# Patient Record
Sex: FEMALE | Race: WHITE | NOT HISPANIC OR LATINO | URBAN - METROPOLITAN AREA
[De-identification: names, ages, dates, MRNs, and addresses within clinical notes are randomized per-mention and may not be internally consistent; named-entity substitution may affect disease eponyms.]

---

## 2017-11-04 ENCOUNTER — OUTPATIENT (OUTPATIENT)
Dept: OUTPATIENT SERVICES | Facility: HOSPITAL | Age: 27
LOS: 1 days | End: 2017-11-04
Payer: COMMERCIAL

## 2017-11-04 DIAGNOSIS — Z3A.00 WEEKS OF GESTATION OF PREGNANCY NOT SPECIFIED: ICD-10-CM

## 2017-11-04 DIAGNOSIS — O26.899 OTHER SPECIFIED PREGNANCY RELATED CONDITIONS, UNSPECIFIED TRIMESTER: ICD-10-CM

## 2017-11-04 PROBLEM — Z00.00 ENCOUNTER FOR PREVENTIVE HEALTH EXAMINATION: Status: ACTIVE | Noted: 2017-11-04

## 2017-11-04 LAB
APPEARANCE UR: CLEAR — SIGNIFICANT CHANGE UP
BILIRUB UR-MCNC: NEGATIVE — SIGNIFICANT CHANGE UP
BLOOD UR QL VISUAL: NEGATIVE — SIGNIFICANT CHANGE UP
COLOR SPEC: SIGNIFICANT CHANGE UP
GLUCOSE UR-MCNC: NEGATIVE — SIGNIFICANT CHANGE UP
KETONES UR-MCNC: SIGNIFICANT CHANGE UP
LEUKOCYTE ESTERASE UR-ACNC: NEGATIVE — SIGNIFICANT CHANGE UP
NITRITE UR-MCNC: NEGATIVE — SIGNIFICANT CHANGE UP
PH UR: 6.5 — SIGNIFICANT CHANGE UP (ref 4.6–8)
PROT UR-MCNC: NEGATIVE — SIGNIFICANT CHANGE UP
RBC CASTS # UR COMP ASSIST: SIGNIFICANT CHANGE UP (ref 0–?)
SP GR SPEC: 1 — SIGNIFICANT CHANGE UP (ref 1–1.03)
SQUAMOUS # UR AUTO: SIGNIFICANT CHANGE UP
UROBILINOGEN FLD QL: NORMAL E.U. — SIGNIFICANT CHANGE UP (ref 0.1–0.2)
WBC UR QL: SIGNIFICANT CHANGE UP (ref 0–?)

## 2017-11-04 PROCEDURE — 99205 OFFICE O/P NEW HI 60 MIN: CPT | Mod: 25

## 2017-11-04 PROCEDURE — 76817 TRANSVAGINAL US OBSTETRIC: CPT | Mod: 26

## 2017-11-04 RX ORDER — SODIUM CHLORIDE 9 MG/ML
1000 INJECTION, SOLUTION INTRAVENOUS
Qty: 0 | Refills: 0 | Status: DISCONTINUED | OUTPATIENT
Start: 2017-11-04 | End: 2017-11-19

## 2017-11-04 RX ORDER — SODIUM CHLORIDE 9 MG/ML
500 INJECTION, SOLUTION INTRAVENOUS ONCE
Qty: 0 | Refills: 0 | Status: COMPLETED | OUTPATIENT
Start: 2017-11-04 | End: 2017-11-04

## 2017-11-04 RX ADMIN — SODIUM CHLORIDE 1500 MILLILITER(S): 9 INJECTION, SOLUTION INTRAVENOUS at 12:15

## 2018-01-05 ENCOUNTER — INPATIENT (INPATIENT)
Facility: HOSPITAL | Age: 28
LOS: 2 days | Discharge: HOME | End: 2018-01-08
Attending: OBSTETRICS & GYNECOLOGY | Admitting: OBSTETRICS & GYNECOLOGY

## 2018-01-05 VITALS — HEIGHT: 65 IN | WEIGHT: 154.32 LBS

## 2018-01-05 DIAGNOSIS — Z3A.00 WEEKS OF GESTATION OF PREGNANCY NOT SPECIFIED: ICD-10-CM

## 2018-01-05 DIAGNOSIS — O26.899 OTHER SPECIFIED PREGNANCY RELATED CONDITIONS, UNSPECIFIED TRIMESTER: ICD-10-CM

## 2018-01-05 LAB
BASOPHILS # BLD AUTO: 0.02 K/UL — SIGNIFICANT CHANGE UP (ref 0–0.2)
BASOPHILS NFR BLD AUTO: 0.2 % — SIGNIFICANT CHANGE UP (ref 0–2)
EOSINOPHIL # BLD AUTO: 0.16 K/UL — SIGNIFICANT CHANGE UP (ref 0–0.5)
EOSINOPHIL NFR BLD AUTO: 1.6 % — SIGNIFICANT CHANGE UP (ref 0–6)
HCT VFR BLD CALC: 33.9 % — LOW (ref 34.5–45)
HGB BLD-MCNC: 11.3 G/DL — LOW (ref 11.5–15.5)
IMM GRANULOCYTES # BLD AUTO: 0.04 # — SIGNIFICANT CHANGE UP
IMM GRANULOCYTES NFR BLD AUTO: 0.4 % — SIGNIFICANT CHANGE UP (ref 0–1.5)
LYMPHOCYTES # BLD AUTO: 2.77 K/UL — SIGNIFICANT CHANGE UP (ref 1–3.3)
LYMPHOCYTES # BLD AUTO: 27.8 % — SIGNIFICANT CHANGE UP (ref 13–44)
MCHC RBC-ENTMCNC: 31 PG — SIGNIFICANT CHANGE UP (ref 27–34)
MCHC RBC-ENTMCNC: 33.3 % — SIGNIFICANT CHANGE UP (ref 32–36)
MCV RBC AUTO: 92.9 FL — SIGNIFICANT CHANGE UP (ref 80–100)
MONOCYTES # BLD AUTO: 0.85 K/UL — SIGNIFICANT CHANGE UP (ref 0–0.9)
MONOCYTES NFR BLD AUTO: 8.5 % — SIGNIFICANT CHANGE UP (ref 2–14)
NEUTROPHILS # BLD AUTO: 6.11 K/UL — SIGNIFICANT CHANGE UP (ref 1.8–7.4)
NEUTROPHILS NFR BLD AUTO: 61.5 % — SIGNIFICANT CHANGE UP (ref 43–77)
NRBC # FLD: 0 — SIGNIFICANT CHANGE UP
PLATELET # BLD AUTO: 197 K/UL — SIGNIFICANT CHANGE UP (ref 150–400)
PMV BLD: 11.3 FL — SIGNIFICANT CHANGE UP (ref 7–13)
RBC # BLD: 3.65 M/UL — LOW (ref 3.8–5.2)
RBC # FLD: 14.1 % — SIGNIFICANT CHANGE UP (ref 10.3–14.5)
WBC # BLD: 9.95 K/UL — SIGNIFICANT CHANGE UP (ref 3.8–10.5)
WBC # FLD AUTO: 9.95 K/UL — SIGNIFICANT CHANGE UP (ref 3.8–10.5)

## 2018-01-05 RX ORDER — AMPICILLIN TRIHYDRATE 250 MG
2 CAPSULE ORAL ONCE
Qty: 0 | Refills: 0 | Status: COMPLETED | OUTPATIENT
Start: 2018-01-05 | End: 2018-01-05

## 2018-01-05 RX ORDER — AMPICILLIN TRIHYDRATE 250 MG
CAPSULE ORAL
Qty: 0 | Refills: 0 | Status: DISCONTINUED | OUTPATIENT
Start: 2018-01-05 | End: 2018-01-06

## 2018-01-05 RX ORDER — CITRIC ACID/SODIUM CITRATE 300-500 MG
15 SOLUTION, ORAL ORAL EVERY 4 HOURS
Qty: 0 | Refills: 0 | Status: DISCONTINUED | OUTPATIENT
Start: 2018-01-05 | End: 2018-01-05

## 2018-01-05 RX ORDER — AMPICILLIN TRIHYDRATE 250 MG
1 CAPSULE ORAL EVERY 4 HOURS
Qty: 0 | Refills: 0 | Status: DISCONTINUED | OUTPATIENT
Start: 2018-01-06 | End: 2018-01-06

## 2018-01-05 RX ORDER — SODIUM CHLORIDE 9 MG/ML
1000 INJECTION, SOLUTION INTRAVENOUS
Qty: 0 | Refills: 0 | Status: DISCONTINUED | OUTPATIENT
Start: 2018-01-05 | End: 2018-01-05

## 2018-01-05 RX ORDER — SODIUM CHLORIDE 9 MG/ML
1000 INJECTION, SOLUTION INTRAVENOUS ONCE
Qty: 0 | Refills: 0 | Status: DISCONTINUED | OUTPATIENT
Start: 2018-01-05 | End: 2018-01-05

## 2018-01-05 RX ORDER — OXYTOCIN 10 UNIT/ML
333.33 VIAL (ML) INJECTION
Qty: 20 | Refills: 0 | Status: DISCONTINUED | OUTPATIENT
Start: 2018-01-05 | End: 2018-01-05

## 2018-01-05 RX ADMIN — SODIUM CHLORIDE 125 MILLILITER(S): 9 INJECTION, SOLUTION INTRAVENOUS at 22:31

## 2018-01-05 RX ADMIN — Medication 216 GRAM(S): at 22:40

## 2018-01-06 ENCOUNTER — TRANSCRIPTION ENCOUNTER (OUTPATIENT)
Age: 28
End: 2018-01-06

## 2018-01-06 LAB — T PALLIDUM AB TITR SER: NEGATIVE — SIGNIFICANT CHANGE UP

## 2018-01-06 RX ORDER — CITRIC ACID/SODIUM CITRATE 300-500 MG
15 SOLUTION, ORAL ORAL EVERY 4 HOURS
Qty: 0 | Refills: 0 | Status: DISCONTINUED | OUTPATIENT
Start: 2018-01-06 | End: 2018-01-06

## 2018-01-06 RX ORDER — MAGNESIUM HYDROXIDE 400 MG/1
30 TABLET, CHEWABLE ORAL
Qty: 0 | Refills: 0 | Status: DISCONTINUED | OUTPATIENT
Start: 2018-01-07 | End: 2018-01-08

## 2018-01-06 RX ORDER — SODIUM CHLORIDE 9 MG/ML
1000 INJECTION, SOLUTION INTRAVENOUS ONCE
Qty: 0 | Refills: 0 | Status: DISCONTINUED | OUTPATIENT
Start: 2018-01-06 | End: 2018-01-06

## 2018-01-06 RX ORDER — DIPHENHYDRAMINE HCL 50 MG
25 CAPSULE ORAL EVERY 6 HOURS
Qty: 0 | Refills: 0 | Status: DISCONTINUED | OUTPATIENT
Start: 2018-01-07 | End: 2018-01-08

## 2018-01-06 RX ORDER — OXYTOCIN 10 UNIT/ML
41.67 VIAL (ML) INJECTION
Qty: 20 | Refills: 0 | Status: DISCONTINUED | OUTPATIENT
Start: 2018-01-06 | End: 2018-01-07

## 2018-01-06 RX ORDER — AER TRAVELER 0.5 G/1
1 SOLUTION RECTAL; TOPICAL EVERY 4 HOURS
Qty: 0 | Refills: 0 | Status: DISCONTINUED | OUTPATIENT
Start: 2018-01-07 | End: 2018-01-08

## 2018-01-06 RX ORDER — SIMETHICONE 80 MG/1
80 TABLET, CHEWABLE ORAL EVERY 6 HOURS
Qty: 0 | Refills: 0 | Status: DISCONTINUED | OUTPATIENT
Start: 2018-01-07 | End: 2018-01-08

## 2018-01-06 RX ORDER — SODIUM CHLORIDE 9 MG/ML
3 INJECTION INTRAMUSCULAR; INTRAVENOUS; SUBCUTANEOUS EVERY 8 HOURS
Qty: 0 | Refills: 0 | Status: DISCONTINUED | OUTPATIENT
Start: 2018-01-07 | End: 2018-01-08

## 2018-01-06 RX ORDER — GLYCERIN ADULT
1 SUPPOSITORY, RECTAL RECTAL AT BEDTIME
Qty: 0 | Refills: 0 | Status: DISCONTINUED | OUTPATIENT
Start: 2018-01-07 | End: 2018-01-08

## 2018-01-06 RX ORDER — DIBUCAINE 1 %
1 OINTMENT (GRAM) RECTAL EVERY 4 HOURS
Qty: 0 | Refills: 0 | Status: DISCONTINUED | OUTPATIENT
Start: 2018-01-06 | End: 2018-01-07

## 2018-01-06 RX ORDER — OXYTOCIN 10 UNIT/ML
333.33 VIAL (ML) INJECTION
Qty: 20 | Refills: 0 | Status: COMPLETED | OUTPATIENT
Start: 2018-01-06

## 2018-01-06 RX ORDER — SODIUM CHLORIDE 9 MG/ML
1000 INJECTION, SOLUTION INTRAVENOUS
Qty: 0 | Refills: 0 | Status: DISCONTINUED | OUTPATIENT
Start: 2018-01-06 | End: 2018-01-06

## 2018-01-06 RX ORDER — KETOROLAC TROMETHAMINE 30 MG/ML
30 SYRINGE (ML) INJECTION ONCE
Qty: 0 | Refills: 0 | Status: DISCONTINUED | OUTPATIENT
Start: 2018-01-06 | End: 2018-01-07

## 2018-01-06 RX ORDER — LANOLIN
1 OINTMENT (GRAM) TOPICAL EVERY 6 HOURS
Qty: 0 | Refills: 0 | Status: DISCONTINUED | OUTPATIENT
Start: 2018-01-07 | End: 2018-01-08

## 2018-01-06 RX ORDER — BUTORPHANOL TARTRATE 2 MG/ML
2 INJECTION, SOLUTION INTRAMUSCULAR; INTRAVENOUS ONCE
Qty: 0 | Refills: 0 | Status: DISCONTINUED | OUTPATIENT
Start: 2018-01-06 | End: 2018-01-06

## 2018-01-06 RX ORDER — ACETAMINOPHEN 500 MG
975 TABLET ORAL EVERY 6 HOURS
Qty: 0 | Refills: 0 | Status: COMPLETED | OUTPATIENT
Start: 2018-01-06 | End: 2018-12-05

## 2018-01-06 RX ORDER — DOCUSATE SODIUM 100 MG
100 CAPSULE ORAL
Qty: 0 | Refills: 0 | Status: DISCONTINUED | OUTPATIENT
Start: 2018-01-07 | End: 2018-01-08

## 2018-01-06 RX ORDER — TETANUS TOXOID, REDUCED DIPHTHERIA TOXOID AND ACELLULAR PERTUSSIS VACCINE, ADSORBED 5; 2.5; 8; 8; 2.5 [IU]/.5ML; [IU]/.5ML; UG/.5ML; UG/.5ML; UG/.5ML
0.5 SUSPENSION INTRAMUSCULAR ONCE
Qty: 0 | Refills: 0 | Status: COMPLETED | OUTPATIENT
Start: 2018-01-07

## 2018-01-06 RX ORDER — AER TRAVELER 0.5 G/1
1 SOLUTION RECTAL; TOPICAL EVERY 4 HOURS
Qty: 0 | Refills: 0 | Status: DISCONTINUED | OUTPATIENT
Start: 2018-01-06 | End: 2018-01-07

## 2018-01-06 RX ORDER — PRAMOXINE HYDROCHLORIDE 150 MG/15G
1 AEROSOL, FOAM RECTAL EVERY 4 HOURS
Qty: 0 | Refills: 0 | Status: DISCONTINUED | OUTPATIENT
Start: 2018-01-06 | End: 2018-01-07

## 2018-01-06 RX ORDER — OXYCODONE HYDROCHLORIDE 5 MG/1
5 TABLET ORAL EVERY 4 HOURS
Qty: 0 | Refills: 0 | Status: DISCONTINUED | OUTPATIENT
Start: 2018-01-06 | End: 2018-01-08

## 2018-01-06 RX ORDER — IBUPROFEN 200 MG
600 TABLET ORAL EVERY 6 HOURS
Qty: 0 | Refills: 0 | Status: COMPLETED | OUTPATIENT
Start: 2018-01-06 | End: 2018-12-05

## 2018-01-06 RX ORDER — SODIUM CHLORIDE 9 MG/ML
3 INJECTION INTRAMUSCULAR; INTRAVENOUS; SUBCUTANEOUS EVERY 8 HOURS
Qty: 0 | Refills: 0 | Status: DISCONTINUED | OUTPATIENT
Start: 2018-01-06 | End: 2018-01-07

## 2018-01-06 RX ORDER — DIBUCAINE 1 %
1 OINTMENT (GRAM) RECTAL EVERY 4 HOURS
Qty: 0 | Refills: 0 | Status: DISCONTINUED | OUTPATIENT
Start: 2018-01-07 | End: 2018-01-08

## 2018-01-06 RX ORDER — OXYCODONE HYDROCHLORIDE 5 MG/1
5 TABLET ORAL
Qty: 0 | Refills: 0 | Status: DISCONTINUED | OUTPATIENT
Start: 2018-01-06 | End: 2018-01-08

## 2018-01-06 RX ORDER — HYDROCORTISONE 1 %
1 OINTMENT (GRAM) TOPICAL EVERY 4 HOURS
Qty: 0 | Refills: 0 | Status: DISCONTINUED | OUTPATIENT
Start: 2018-01-06 | End: 2018-01-07

## 2018-01-06 RX ADMIN — Medication 204 MILLIGRAM(S): at 16:52

## 2018-01-06 RX ADMIN — BUTORPHANOL TARTRATE 2 MILLIGRAM(S): 2 INJECTION, SOLUTION INTRAMUSCULAR; INTRAVENOUS at 19:03

## 2018-01-06 RX ADMIN — Medication 108 GRAM(S): at 21:30

## 2018-01-06 RX ADMIN — Medication 108 GRAM(S): at 16:47

## 2018-01-06 RX ADMIN — Medication 125 MILLIUNIT(S)/MIN: at 23:56

## 2018-01-06 RX ADMIN — Medication 108 GRAM(S): at 03:33

## 2018-01-06 RX ADMIN — Medication 108 GRAM(S): at 12:37

## 2018-01-06 RX ADMIN — SODIUM CHLORIDE 125 MILLILITER(S): 9 INJECTION, SOLUTION INTRAVENOUS at 12:18

## 2018-01-06 RX ADMIN — Medication 108 GRAM(S): at 08:09

## 2018-01-06 RX ADMIN — BUTORPHANOL TARTRATE 2 MILLIGRAM(S): 2 INJECTION, SOLUTION INTRAMUSCULAR; INTRAVENOUS at 16:52

## 2018-01-07 RX ORDER — ACETAMINOPHEN 500 MG
3 TABLET ORAL
Qty: 0 | Refills: 0 | DISCHARGE
Start: 2018-01-07

## 2018-01-07 RX ORDER — HYDROCORTISONE 1 %
1 OINTMENT (GRAM) TOPICAL EVERY 4 HOURS
Qty: 0 | Refills: 0 | Status: DISCONTINUED | OUTPATIENT
Start: 2018-01-07 | End: 2018-01-07

## 2018-01-07 RX ORDER — PRAMOXINE HYDROCHLORIDE 150 MG/15G
1 AEROSOL, FOAM RECTAL EVERY 4 HOURS
Qty: 0 | Refills: 0 | Status: DISCONTINUED | OUTPATIENT
Start: 2018-01-07 | End: 2018-01-08

## 2018-01-07 RX ORDER — IBUPROFEN 200 MG
1 TABLET ORAL
Qty: 0 | Refills: 0 | DISCHARGE
Start: 2018-01-07

## 2018-01-07 RX ORDER — ACETAMINOPHEN 500 MG
975 TABLET ORAL EVERY 6 HOURS
Qty: 0 | Refills: 0 | Status: DISCONTINUED | OUTPATIENT
Start: 2018-01-07 | End: 2018-01-08

## 2018-01-07 RX ORDER — PRAMOXINE HYDROCHLORIDE 150 MG/15G
1 AEROSOL, FOAM RECTAL EVERY 4 HOURS
Qty: 0 | Refills: 0 | Status: DISCONTINUED | OUTPATIENT
Start: 2018-01-07 | End: 2018-01-07

## 2018-01-07 RX ORDER — IBUPROFEN 200 MG
1 TABLET ORAL
Qty: 0 | Refills: 0 | COMMUNITY
Start: 2018-01-07

## 2018-01-07 RX ORDER — ACETAMINOPHEN 500 MG
3 TABLET ORAL
Qty: 0 | Refills: 0 | COMMUNITY
Start: 2018-01-07

## 2018-01-07 RX ORDER — HYDROCORTISONE 1 %
1 OINTMENT (GRAM) TOPICAL EVERY 4 HOURS
Qty: 0 | Refills: 0 | Status: DISCONTINUED | OUTPATIENT
Start: 2018-01-07 | End: 2018-01-08

## 2018-01-07 RX ORDER — OXYTOCIN 10 UNIT/ML
41.67 VIAL (ML) INJECTION
Qty: 20 | Refills: 0 | Status: DISCONTINUED | OUTPATIENT
Start: 2018-01-07 | End: 2018-01-07

## 2018-01-07 RX ORDER — OXYTOCIN 10 UNIT/ML
333.33 VIAL (ML) INJECTION
Qty: 20 | Refills: 0 | Status: DISCONTINUED | OUTPATIENT
Start: 2018-01-07 | End: 2018-01-07

## 2018-01-07 RX ORDER — IBUPROFEN 200 MG
600 TABLET ORAL EVERY 6 HOURS
Qty: 0 | Refills: 0 | Status: DISCONTINUED | OUTPATIENT
Start: 2018-01-07 | End: 2018-01-08

## 2018-01-07 RX ADMIN — Medication 975 MILLIGRAM(S): at 06:51

## 2018-01-07 RX ADMIN — Medication 975 MILLIGRAM(S): at 11:40

## 2018-01-07 RX ADMIN — Medication 600 MILLIGRAM(S): at 18:48

## 2018-01-07 RX ADMIN — Medication 30 MILLIGRAM(S): at 00:35

## 2018-01-07 RX ADMIN — Medication 1000 MILLIUNIT(S)/MIN: at 01:42

## 2018-01-07 RX ADMIN — Medication 600 MILLIGRAM(S): at 12:19

## 2018-01-07 RX ADMIN — Medication 100 MILLIGRAM(S): at 11:40

## 2018-01-07 RX ADMIN — Medication 975 MILLIGRAM(S): at 18:48

## 2018-01-07 RX ADMIN — Medication 600 MILLIGRAM(S): at 11:39

## 2018-01-07 RX ADMIN — Medication 125 MILLIUNIT(S)/MIN: at 03:53

## 2018-01-07 RX ADMIN — Medication 1 APPLICATION(S): at 06:39

## 2018-01-07 RX ADMIN — Medication 1 TABLET(S): at 11:40

## 2018-01-07 RX ADMIN — Medication 600 MILLIGRAM(S): at 17:58

## 2018-01-07 RX ADMIN — Medication 975 MILLIGRAM(S): at 17:58

## 2018-01-07 RX ADMIN — Medication 975 MILLIGRAM(S): at 06:23

## 2018-01-07 RX ADMIN — Medication 600 MILLIGRAM(S): at 06:51

## 2018-01-07 RX ADMIN — Medication 600 MILLIGRAM(S): at 06:23

## 2018-01-07 RX ADMIN — Medication 975 MILLIGRAM(S): at 12:19

## 2018-01-07 RX ADMIN — PRAMOXINE HYDROCHLORIDE 1 APPLICATION(S): 150 AEROSOL, FOAM RECTAL at 06:39

## 2018-01-07 RX ADMIN — Medication 30 MILLIGRAM(S): at 01:42

## 2018-01-07 RX ADMIN — SODIUM CHLORIDE 3 MILLILITER(S): 9 INJECTION INTRAMUSCULAR; INTRAVENOUS; SUBCUTANEOUS at 05:28

## 2018-01-07 NOTE — LACTATION INITIAL EVALUATION - INTERVENTION OUTCOME
verbalizes understanding/demonstrates understanding of teaching/needs not met/Continue to follow and assist as needed.

## 2018-01-07 NOTE — DISCHARGE NOTE OB - MEDICATION SUMMARY - MEDICATIONS TO TAKE
I will START or STAY ON the medications listed below when I get home from the hospital:    acetaminophen 325 mg oral tablet  -- 3 tab(s) by mouth every 6 hours  -- Indication: For Vacuum extractor delivery, delivered    ibuprofen 600 mg oral tablet  -- 1 tab(s) by mouth every 6 hours  -- Indication: For Vacuum extractor delivery, delivered I will START or STAY ON the medications listed below when I get home from the hospital:    ibuprofen 600 mg oral tablet  -- 1 tab(s) by mouth every 6 hours, As Needed  -- Indication: For pain    acetaminophen 325 mg oral tablet  -- 3 tab(s) by mouth every 6 hours, As Needed  -- Indication: For pain    Prenatal Multivitamins with Folic Acid 1 mg oral tablet  -- 1 tab(s) by mouth once a day  -- Indication: For Vitamins

## 2018-01-07 NOTE — DISCHARGE NOTE OB - CARE PROVIDER_API CALL
Hannah Turner), Obstetrics and Gynecology  [address]  [address]  [place] [address]  Phone: [phone]  Fax: [phone]

## 2018-01-07 NOTE — PROGRESS NOTE ADULT - SUBJECTIVE AND OBJECTIVE BOX
OB Progress Note: VAVD PPD#1    S: 28yo   PPD#1 s/p VAVD. Patient feels well. Pain is well controlled. She is tolerating a regular diet and passing flatus. She is voiding spontaneously, and ambulating without difficulty. Denies CP/SOB. Denies lightheadedness/dizziness. Denies N/V.    O:  Vitals:  Vital Signs Last 24 Hrs  T(C): 36.4 (2018 06:23), Max: 37.2 (2018 02:20)  T(F): 97.6 (2018 06:23), Max: 98.9 (2018 02:20)  HR: 65 (2018 06:23) (62 - 75)  BP: 108/52 (2018 06:23) (108/52 - 134/62)  BP(mean): --  RR: 18 (2018 06:23) (12 - 18)  SpO2: 99% (2018 06:23) (99% - 100%)    MEDICATIONS  (STANDING):  acetaminophen   Tablet. 975 milliGRAM(s) Oral every 6 hours  diphtheria/tetanus/pertussis (acellular) Vaccine (ADAcel) 0.5 milliLiter(s) IntraMuscular once  ibuprofen  Tablet 600 milliGRAM(s) Oral every 6 hours  oxyCODONE    IR 5 milliGRAM(s) Oral every 3 hours  prenatal multivitamin 1 Tablet(s) Oral daily  sodium chloride 0.9% lock flush 3 milliLiter(s) IV Push every 8 hours      Labs:  Blood type:   Rubella IgG: RPR: Negative                          11.3<L>   9.95 >-----------< 197    (  @ 23:35 )             33.9<L>                  Physical Exam:  General: NAD  Abdomen: soft, non-tender, non-distended, fundus firm  Vaginal: Lochia wnl  Extremities: No erythema/edema

## 2018-01-07 NOTE — DISCHARGE NOTE OB - CARE PLAN
Principal Discharge DX:	Vacuum extractor delivery, delivered  Goal:	pregnancy delivered  Instructions for follow-up, activity and diet:	diet and activity as tolerated

## 2018-01-07 NOTE — DISCHARGE NOTE OB - PATIENT PORTAL LINK FT
“You can access the FollowHealth Patient Portal, offered by [facility], by registering with the following website: [email or web address]”

## 2018-01-07 NOTE — LACTATION INITIAL EVALUATION - INTERVENTION OUTCOME
verbalizes understanding/demonstrates understanding of teaching/Continue to follow and assist as needed./good return demonstration/needs not met

## 2018-01-07 NOTE — PROGRESS NOTE ADULT - PROBLEM SELECTOR PLAN 1
- Pain well controlled, continue current pain regimen  - Increase ambulation, SCDs when not ambulating  - Continue regular diet    Sanford Chery PGY-1

## 2018-01-07 NOTE — PROGRESS NOTE ADULT - SUBJECTIVE AND OBJECTIVE BOX
PP#1    Pt with no complaints overnight.  Tolerated small amounts of food last night -N/-V.  +flatus, voiding spontaneously.  Denies HA, CP, SOB, or calf pain.  Reports moderate lochia.    ICU Vital Signs Last 24 Hrs  T(C): 36.4 (2018 06:23), Max: 37.2 (2018 02:20)  T(F): 97.6 (2018 06:23), Max: 98.9 (2018 02:20)  HR: 65 (2018 06:23) (62 - 75)  BP: 108/52 (2018 06:23) (108/52 - 134/62)  BP(mean): --  ABP: --  ABP(mean): --  RR: 18 (2018 06:23) (12 - 18)  SpO2: 99% (2018 06:23) (99% - 100%)      General: NAD  Heart: RRR  Lungs: CTA BL  Abd +BS, soft,   Uterus Fundus firm below umbilicus  Ext: no edema, no calf tenderness b/l      A/P 28 yo  PP #1 sp VAVD with episiotomy @ 41.4wga .  1.  Continue routine PP care  2.  Dc home tomorrow    Lashanda Sneed MD

## 2018-01-07 NOTE — LACTATION INITIAL EVALUATION - LACTATION INTERVENTIONS
Infant s/p bath.  Very sleepy.  Mother has good amounts of colostrum.  Shown manual expression and how to syringe feed.  Reassured the sleepy behavior is normal.  Infant opens wide but has disorganized suck.  Recommended safe skin to skin.  Educated in use of NEW BEGINNINGS harman.  Encouraged to keep feeding log and to feed on cue./initiate skin to skin/initiate hand expression routine

## 2018-01-07 NOTE — LACTATION INITIAL EVALUATION - LACTATION INTERVENTIONS
Assisted with latch and positioning.  Mother coming into infant.  Instructed to bring infant into her.  Infant eager and vigorous.  Educated on the use of the NEW Raydiance harman.  Encouraged to feed on cue and to keep feeding log.  Discussed benefits of exclusive breastfeeding./initiate skin to skin/initiate hand expression routine

## 2018-01-08 VITALS
SYSTOLIC BLOOD PRESSURE: 112 MMHG | HEART RATE: 83 BPM | TEMPERATURE: 98 F | RESPIRATION RATE: 18 BRPM | OXYGEN SATURATION: 99 % | DIASTOLIC BLOOD PRESSURE: 67 MMHG

## 2018-01-08 DIAGNOSIS — O26.899 OTHER SPECIFIED PREGNANCY RELATED CONDITIONS, UNSPECIFIED TRIMESTER: ICD-10-CM

## 2018-01-08 RX ORDER — TETANUS TOXOID, REDUCED DIPHTHERIA TOXOID AND ACELLULAR PERTUSSIS VACCINE, ADSORBED 5; 2.5; 8; 8; 2.5 [IU]/.5ML; [IU]/.5ML; UG/.5ML; UG/.5ML; UG/.5ML
0.5 SUSPENSION INTRAMUSCULAR ONCE
Qty: 0 | Refills: 0 | Status: COMPLETED | OUTPATIENT
Start: 2018-01-08 | End: 2018-01-08

## 2018-01-08 RX ADMIN — Medication 600 MILLIGRAM(S): at 03:30

## 2018-01-08 RX ADMIN — Medication 975 MILLIGRAM(S): at 03:30

## 2018-01-08 RX ADMIN — Medication 600 MILLIGRAM(S): at 08:45

## 2018-01-08 RX ADMIN — Medication 975 MILLIGRAM(S): at 09:15

## 2018-01-08 RX ADMIN — TETANUS TOXOID, REDUCED DIPHTHERIA TOXOID AND ACELLULAR PERTUSSIS VACCINE, ADSORBED 0.5 MILLILITER(S): 5; 2.5; 8; 8; 2.5 SUSPENSION INTRAMUSCULAR at 13:41

## 2018-01-08 RX ADMIN — Medication 600 MILLIGRAM(S): at 09:15

## 2018-01-08 RX ADMIN — Medication 600 MILLIGRAM(S): at 02:29

## 2018-01-08 RX ADMIN — Medication 600 MILLIGRAM(S): at 13:40

## 2018-01-08 RX ADMIN — Medication 975 MILLIGRAM(S): at 08:45

## 2018-01-08 RX ADMIN — Medication 1 TABLET(S): at 08:46

## 2018-01-08 RX ADMIN — Medication 975 MILLIGRAM(S): at 02:30

## 2018-01-08 RX ADMIN — Medication 975 MILLIGRAM(S): at 14:10

## 2018-01-08 RX ADMIN — Medication 975 MILLIGRAM(S): at 13:40

## 2018-01-08 RX ADMIN — Medication 600 MILLIGRAM(S): at 14:10

## 2018-04-28 ENCOUNTER — RESULT REVIEW (OUTPATIENT)
Age: 28
End: 2018-04-28

## 2019-09-23 ENCOUNTER — ASOB RESULT (OUTPATIENT)
Age: 29
End: 2019-09-23

## 2019-09-23 ENCOUNTER — APPOINTMENT (OUTPATIENT)
Dept: ANTEPARTUM | Facility: CLINIC | Age: 29
End: 2019-09-23
Payer: COMMERCIAL

## 2019-09-23 PROCEDURE — 76811 OB US DETAILED SNGL FETUS: CPT

## 2019-09-23 PROCEDURE — 76817 TRANSVAGINAL US OBSTETRIC: CPT

## 2020-01-07 ENCOUNTER — TRANSCRIPTION ENCOUNTER (OUTPATIENT)
Age: 30
End: 2020-01-07

## 2020-01-27 ENCOUNTER — INPATIENT (INPATIENT)
Facility: HOSPITAL | Age: 30
LOS: 1 days | Discharge: HOME | End: 2020-01-29
Attending: OBSTETRICS & GYNECOLOGY | Admitting: OBSTETRICS & GYNECOLOGY
Payer: COMMERCIAL

## 2020-01-27 VITALS — WEIGHT: 149.91 LBS | HEIGHT: 65 IN

## 2020-01-27 DIAGNOSIS — O26.899 OTHER SPECIFIED PREGNANCY RELATED CONDITIONS, UNSPECIFIED TRIMESTER: ICD-10-CM

## 2020-01-27 DIAGNOSIS — Z3A.00 WEEKS OF GESTATION OF PREGNANCY NOT SPECIFIED: ICD-10-CM

## 2020-01-27 DIAGNOSIS — Z34.80 ENCOUNTER FOR SUPERVISION OF OTHER NORMAL PREGNANCY, UNSPECIFIED TRIMESTER: ICD-10-CM

## 2020-01-27 LAB
BASOPHILS # BLD AUTO: 0.03 K/UL — SIGNIFICANT CHANGE UP (ref 0–0.2)
BASOPHILS NFR BLD AUTO: 0.3 % — SIGNIFICANT CHANGE UP (ref 0–2)
BLD GP AB SCN SERPL QL: NEGATIVE — SIGNIFICANT CHANGE UP
EOSINOPHIL # BLD AUTO: 0.1 K/UL — SIGNIFICANT CHANGE UP (ref 0–0.5)
EOSINOPHIL NFR BLD AUTO: 1.1 % — SIGNIFICANT CHANGE UP (ref 0–6)
HCT VFR BLD CALC: 37.6 % — SIGNIFICANT CHANGE UP (ref 34.5–45)
HGB BLD-MCNC: 12.2 G/DL — SIGNIFICANT CHANGE UP (ref 11.5–15.5)
IMM GRANULOCYTES NFR BLD AUTO: 0.5 % — SIGNIFICANT CHANGE UP (ref 0–1.5)
LYMPHOCYTES # BLD AUTO: 2.22 K/UL — SIGNIFICANT CHANGE UP (ref 1–3.3)
LYMPHOCYTES # BLD AUTO: 23.5 % — SIGNIFICANT CHANGE UP (ref 13–44)
MCHC RBC-ENTMCNC: 30.9 PG — SIGNIFICANT CHANGE UP (ref 27–34)
MCHC RBC-ENTMCNC: 32.4 GM/DL — SIGNIFICANT CHANGE UP (ref 32–36)
MCV RBC AUTO: 95.2 FL — SIGNIFICANT CHANGE UP (ref 80–100)
MONOCYTES # BLD AUTO: 0.7 K/UL — SIGNIFICANT CHANGE UP (ref 0–0.9)
MONOCYTES NFR BLD AUTO: 7.4 % — SIGNIFICANT CHANGE UP (ref 2–14)
NEUTROPHILS # BLD AUTO: 6.34 K/UL — SIGNIFICANT CHANGE UP (ref 1.8–7.4)
NEUTROPHILS NFR BLD AUTO: 67.2 % — SIGNIFICANT CHANGE UP (ref 43–77)
NRBC # BLD: 0 /100 WBCS — SIGNIFICANT CHANGE UP (ref 0–0)
PLATELET # BLD AUTO: 179 K/UL — SIGNIFICANT CHANGE UP (ref 150–400)
RBC # BLD: 3.95 M/UL — SIGNIFICANT CHANGE UP (ref 3.8–5.2)
RBC # FLD: 12.9 % — SIGNIFICANT CHANGE UP (ref 10.3–14.5)
RH IG SCN BLD-IMP: POSITIVE — SIGNIFICANT CHANGE UP
RH IG SCN BLD-IMP: POSITIVE — SIGNIFICANT CHANGE UP
T PALLIDUM AB TITR SER: NEGATIVE — SIGNIFICANT CHANGE UP
WBC # BLD: 9.44 K/UL — SIGNIFICANT CHANGE UP (ref 3.8–10.5)
WBC # FLD AUTO: 9.44 K/UL — SIGNIFICANT CHANGE UP (ref 3.8–10.5)

## 2020-01-27 RX ORDER — DIBUCAINE 1 %
1 OINTMENT (GRAM) RECTAL EVERY 6 HOURS
Refills: 0 | Status: DISCONTINUED | OUTPATIENT
Start: 2020-01-27 | End: 2020-01-29

## 2020-01-27 RX ORDER — OXYCODONE HYDROCHLORIDE 5 MG/1
5 TABLET ORAL
Refills: 0 | Status: DISCONTINUED | OUTPATIENT
Start: 2020-01-27 | End: 2020-01-29

## 2020-01-27 RX ORDER — KETOROLAC TROMETHAMINE 30 MG/ML
30 SYRINGE (ML) INJECTION ONCE
Refills: 0 | Status: DISCONTINUED | OUTPATIENT
Start: 2020-01-27 | End: 2020-01-27

## 2020-01-27 RX ORDER — LANOLIN
1 OINTMENT (GRAM) TOPICAL EVERY 6 HOURS
Refills: 0 | Status: DISCONTINUED | OUTPATIENT
Start: 2020-01-27 | End: 2020-01-29

## 2020-01-27 RX ORDER — HYDROCORTISONE 1 %
1 OINTMENT (GRAM) TOPICAL EVERY 6 HOURS
Refills: 0 | Status: DISCONTINUED | OUTPATIENT
Start: 2020-01-27 | End: 2020-01-29

## 2020-01-27 RX ORDER — CITRIC ACID/SODIUM CITRATE 300-500 MG
15 SOLUTION, ORAL ORAL EVERY 6 HOURS
Refills: 0 | Status: DISCONTINUED | OUTPATIENT
Start: 2020-01-27 | End: 2020-01-27

## 2020-01-27 RX ORDER — ACETAMINOPHEN 500 MG
975 TABLET ORAL
Refills: 0 | Status: DISCONTINUED | OUTPATIENT
Start: 2020-01-27 | End: 2020-01-29

## 2020-01-27 RX ORDER — MAGNESIUM HYDROXIDE 400 MG/1
30 TABLET, CHEWABLE ORAL
Refills: 0 | Status: DISCONTINUED | OUTPATIENT
Start: 2020-01-27 | End: 2020-01-29

## 2020-01-27 RX ORDER — SODIUM CHLORIDE 9 MG/ML
3 INJECTION INTRAMUSCULAR; INTRAVENOUS; SUBCUTANEOUS EVERY 8 HOURS
Refills: 0 | Status: DISCONTINUED | OUTPATIENT
Start: 2020-01-27 | End: 2020-01-29

## 2020-01-27 RX ORDER — DIPHENHYDRAMINE HCL 50 MG
25 CAPSULE ORAL EVERY 6 HOURS
Refills: 0 | Status: DISCONTINUED | OUTPATIENT
Start: 2020-01-27 | End: 2020-01-29

## 2020-01-27 RX ORDER — OXYTOCIN 10 UNIT/ML
2 VIAL (ML) INJECTION
Qty: 30 | Refills: 0 | Status: DISCONTINUED | OUTPATIENT
Start: 2020-01-27 | End: 2020-01-29

## 2020-01-27 RX ORDER — AER TRAVELER 0.5 G/1
1 SOLUTION RECTAL; TOPICAL EVERY 4 HOURS
Refills: 0 | Status: DISCONTINUED | OUTPATIENT
Start: 2020-01-27 | End: 2020-01-29

## 2020-01-27 RX ORDER — OXYTOCIN 10 UNIT/ML
4 VIAL (ML) INJECTION
Qty: 30 | Refills: 0 | Status: DISCONTINUED | OUTPATIENT
Start: 2020-01-27 | End: 2020-01-27

## 2020-01-27 RX ORDER — SIMETHICONE 80 MG/1
80 TABLET, CHEWABLE ORAL EVERY 4 HOURS
Refills: 0 | Status: DISCONTINUED | OUTPATIENT
Start: 2020-01-27 | End: 2020-01-29

## 2020-01-27 RX ORDER — SODIUM CHLORIDE 9 MG/ML
1000 INJECTION, SOLUTION INTRAVENOUS
Refills: 0 | Status: DISCONTINUED | OUTPATIENT
Start: 2020-01-27 | End: 2020-01-29

## 2020-01-27 RX ORDER — BENZOCAINE 10 %
1 GEL (GRAM) MUCOUS MEMBRANE EVERY 6 HOURS
Refills: 0 | Status: DISCONTINUED | OUTPATIENT
Start: 2020-01-27 | End: 2020-01-29

## 2020-01-27 RX ORDER — OXYCODONE HYDROCHLORIDE 5 MG/1
5 TABLET ORAL ONCE
Refills: 0 | Status: DISCONTINUED | OUTPATIENT
Start: 2020-01-27 | End: 2020-01-29

## 2020-01-27 RX ORDER — IBUPROFEN 200 MG
600 TABLET ORAL EVERY 6 HOURS
Refills: 0 | Status: COMPLETED | OUTPATIENT
Start: 2020-01-27 | End: 2020-12-25

## 2020-01-27 RX ORDER — SODIUM CHLORIDE 9 MG/ML
1000 INJECTION, SOLUTION INTRAVENOUS
Refills: 0 | Status: DISCONTINUED | OUTPATIENT
Start: 2020-01-27 | End: 2020-01-27

## 2020-01-27 RX ORDER — PRAMOXINE HYDROCHLORIDE 150 MG/15G
1 AEROSOL, FOAM RECTAL EVERY 4 HOURS
Refills: 0 | Status: DISCONTINUED | OUTPATIENT
Start: 2020-01-27 | End: 2020-01-29

## 2020-01-27 RX ORDER — IBUPROFEN 200 MG
600 TABLET ORAL EVERY 6 HOURS
Refills: 0 | Status: DISCONTINUED | OUTPATIENT
Start: 2020-01-27 | End: 2020-01-29

## 2020-01-27 RX ORDER — OXYTOCIN 10 UNIT/ML
333.33 VIAL (ML) INJECTION
Qty: 20 | Refills: 0 | Status: DISCONTINUED | OUTPATIENT
Start: 2020-01-27 | End: 2020-01-29

## 2020-01-27 RX ORDER — TETANUS TOXOID, REDUCED DIPHTHERIA TOXOID AND ACELLULAR PERTUSSIS VACCINE, ADSORBED 5; 2.5; 8; 8; 2.5 [IU]/.5ML; [IU]/.5ML; UG/.5ML; UG/.5ML; UG/.5ML
0.5 SUSPENSION INTRAMUSCULAR ONCE
Refills: 0 | Status: DISCONTINUED | OUTPATIENT
Start: 2020-01-27 | End: 2020-01-29

## 2020-01-27 RX ORDER — GLYCERIN ADULT
1 SUPPOSITORY, RECTAL RECTAL AT BEDTIME
Refills: 0 | Status: DISCONTINUED | OUTPATIENT
Start: 2020-01-27 | End: 2020-01-29

## 2020-01-27 RX ADMIN — SODIUM CHLORIDE 125 MILLILITER(S): 9 INJECTION, SOLUTION INTRAVENOUS at 16:37

## 2020-01-27 RX ADMIN — Medication 2 MILLIUNIT(S)/MIN: at 16:45

## 2020-01-27 RX ADMIN — SODIUM CHLORIDE 125 MILLILITER(S): 9 INJECTION, SOLUTION INTRAVENOUS at 16:36

## 2020-01-27 RX ADMIN — Medication 30 MILLIGRAM(S): at 22:35

## 2020-01-27 RX ADMIN — Medication 0.25 MILLIGRAM(S): at 20:25

## 2020-01-27 NOTE — PRE-ANESTHESIA EVALUATION ADULT - NSANTHOSAYNRD_GEN_A_CORE
No. JULY screening performed.  STOP BANG Legend: 0-2 = LOW Risk; 3-4 = INTERMEDIATE Risk; 5-8 = HIGH Risk

## 2020-01-27 NOTE — PATIENT PROFILE OB - SOURCE OF INFORMATION, OB PROFILE
OFFICE VISIT  Denny Haas 60 y o  female MRN: [id number]      Assessment / Plan:  Diagnoses and all orders for this visit:    Type 2 diabetes mellitus without complication, without long-term current use of insulin ([facility])  -     POCT hemoglobin A1c  -     metFORMIN (GLUCOPHAGE) 1000 MG tablet; Take 1 tablet (1,000 mg total) by mouth 2 (two) times a day with meals    Other migraine without status migrainosus, not intractable  -     amitriptyline (ELAVIL) 100 mg tablet; Take 0 5 tablets (50 mg total) by mouth daily at bedtime          Reason For Visit / Chief Complaint  Chief Complaint   Patient presents with    Diabetes     A1C        HPI:  Denny Haas is a 60 y o  female who presents today for follow up, known type 2 diabeteic on metformin, she has had weight gain since last visit, she admit to eating a lot of sweets and using sugar, today her AIC 8 5  She reports headaches, dull, has tried topamax without relief        Historical Information   Past Medical History:   Diagnosis Date    Colitis     COPD (chronic obstructive pulmonary disease) ([facility])     GERD (gastroesophageal reflux disease)     Sleep apnea      Past Surgical History:   Procedure Laterality Date    BREAST LUMPECTOMY Right     IVC FILTER INSERTION       Social History   History   Alcohol Use No     History   Drug Use No     History   Smoking Status    Current Every Day Smoker   Smokeless Tobacco    Never Used     Family History   Problem Relation Age of Onset    Breast cancer Mother     COPD Mother     Coronary artery disease Mother     Coronary artery disease Father     Stroke Father        Meds/Allergies   Allergies   Allergen Reactions    Augmentin [Amoxicillin-Pot Clavulanate] GI Intolerance       Meds:    Current Outpatient Prescriptions:     albuterol (2 5 mg/3 mL) 0 083 % nebulizer solution, Inhale 1 each 4 (four) times a day as needed, Disp: , Rfl:     albuterol (VENTOLIN HFA) 90 mcg/act inhaler, Inhale 2 puffs, Disp: , Rfl:     amitriptyline (ELAVIL) 100 mg tablet, Take 0 5 tablets (50 mg total) by mouth daily at bedtime, Disp: 30 tablet, Rfl: 1    atorvastatin (LIPITOR) 40 mg tablet, Take 40 mg by mouth , Disp: , Rfl:     Blood Glucose Monitoring Suppl (ONE TOUCH ULTRA 2) w/Device KIT, by Does not apply route, Disp: , Rfl:     Calcium Carbonate-Vit D-Min (CALCIUM 1200 PO), Take 1,200 mg by mouth daily  , Disp: , Rfl:     chlorhexidine (PERIDEX) 0 12 % solution, Apply 15 mL to the mouth or throat 2 (two) times a day, Disp: 120 mL, Rfl: 0    [employer] NTS STEP 1 21 MG/24HR TD 24 hr patch, APPLY 1 PATCH DAILY AS DIRECTED , Disp: 21 patch, Rfl: 0    fluticasone (FLONASE) 50 mcg/act nasal spray, 2 sprays into each nostril daily, Disp: , Rfl:     gabapentin (NEURONTIN) 600 MG tablet, Take 300 mg by mouth 3 (three) times a day, Disp: , Rfl:     glucose blood (ONE TOUCH ULTRA TEST) test strip, 1 Squirt by In Vitro route daily, Disp: , Rfl:     hydrOXYzine HCL (ATARAX) 25 mg tablet, Take 25 mg by mouth daily as needed for itching, Disp: , Rfl:     levothyroxine 25 mcg tablet, Take 1 tablet by mouth, Disp: , Rfl:     lisinopril (ZESTRIL) 2 5 mg tablet, Take 2 5 mg by mouth daily, Disp: , Rfl:     metFORMIN (GLUCOPHAGE) 1000 MG tablet, Take 1 tablet (1,000 mg total) by mouth 2 (two) times a day with meals, Disp: 60 tablet, Rfl: 1    Mometasone Furo-Formoterol Fum (DULERA) 100-5 MCG/ACT AERO, Inhale 2 puffs 2 (two) times a day, Disp: , Rfl:     ONETOUCH DELICA LANCETS 39N MISC, by Does not apply route, Disp: , Rfl:     pantoprazole (PROTONIX) 20 mg tablet, Take 20 mg by mouth 2 (two) times a day  , Disp: , Rfl:     vilazodone (VIIBRYD) 40 mg tablet, Take 40 mg by mouth daily  , Disp: , Rfl:     VRAYLAR 4 5 MG CAPS, Take 1 capsule by mouth daily at bedtime, Disp: , Rfl: 0      REVIEW OF SYSTEMS  A comprehensive review of systems was negative except for: Neurological: positive for Symptoms;  Neuro: headaches      Current Vitals: patient Blood Pressure: 114/68 (04/19/18 1335)  Pulse: 87 (04/19/18 1335)  Temperature: 97 7 °F (36 5 °C) (04/19/18 1335)  Respirations: 18 (04/19/18 1335)  Height: 5' 7" (170 2 cm) (04/19/18 1335)  Weight - Scale: 110 kg (242 lb) (04/19/18 1335)  SpO2: 96 % (04/19/18 1335)  [unfilled]    PHYSICAL EXAMS:  General appearance: alert and oriented, in no acute distress  Lungs: clear to auscultation bilaterally  Heart: regular rate and rhythm, S1, S2 normal, no murmur, click, rub or gallop  Abdomen: soft, non-tender; bowel sounds normal; no masses,  no organomegaly  Extremities: extremities normal, warm and well-perfused; no cyanosis, clubbing, or edema  Skin: Skin color, texture, turgor normal  No rashes or lesions  Lymph nodes: Cervical, supraclavicular, and axillary nodes normal   Neurologic: Grossly normal{YES/NO:20        Follow up at this office in 3 months    Counseling / Coordination of Care  Total floor / unit time spent today 20 minutes  Greater than 50% of total time was spent with the patient and / or family counseling and / or coordination of care

## 2020-01-28 LAB
HCT VFR BLD CALC: 31 % — LOW (ref 34.5–45)
HGB BLD-MCNC: 10.7 G/DL — LOW (ref 11.5–15.5)

## 2020-01-28 RX ADMIN — Medication 600 MILLIGRAM(S): at 17:26

## 2020-01-28 RX ADMIN — Medication 600 MILLIGRAM(S): at 11:37

## 2020-01-28 RX ADMIN — Medication 975 MILLIGRAM(S): at 09:17

## 2020-01-28 RX ADMIN — Medication 975 MILLIGRAM(S): at 22:22

## 2020-01-28 RX ADMIN — Medication 975 MILLIGRAM(S): at 21:52

## 2020-01-28 RX ADMIN — Medication 600 MILLIGRAM(S): at 05:48

## 2020-01-28 RX ADMIN — Medication 600 MILLIGRAM(S): at 15:08

## 2020-01-28 RX ADMIN — Medication 975 MILLIGRAM(S): at 11:27

## 2020-01-28 RX ADMIN — Medication 1 TABLET(S): at 11:36

## 2020-01-28 RX ADMIN — Medication 975 MILLIGRAM(S): at 15:09

## 2020-01-28 RX ADMIN — Medication 975 MILLIGRAM(S): at 03:12

## 2020-01-28 RX ADMIN — Medication 600 MILLIGRAM(S): at 06:18

## 2020-01-28 RX ADMIN — Medication 975 MILLIGRAM(S): at 03:42

## 2020-01-28 NOTE — PROGRESS NOTE ADULT - SUBJECTIVE AND OBJECTIVE BOX
28yo seen and examined at bedside, no acute overnight events. Patient denies current complaints, her pain is well controlled. States she is ambulating, voiding spontaneously, passing gas, and tolerating regular diet. Denies CP, SOB, N/V, HA, blurred vision, epigastric pain.    Vital Signs Last 24 Hours  T(C): 36.9 (01-27-20 @ 23:45), Max: 36.9 (01-27-20 @ 20:50)  HR: 60 (01-27-20 @ 23:45) (54 - 95)  BP: 115/70 (01-27-20 @ 23:45) (114/72 - 135/73)  RR: 18 (01-27-20 @ 23:45) (17 - 19)  SpO2: 98% (01-27-20 @ 23:45) (95% - 99%)    Physical Exam:  General: NAD, resting comfortably in bed   Abdomen: Soft, non-tender, non-distended, fundus firm  Extremities: Full ROM, moving all extremities spontaneously  Pelvic: Lochia wnl    Labs:    Blood Type: O Positive  Antibody Screen: Negative  RPR: Negative               12.2   9.44  )-----------( 179      ( 01-27 @ 13:44 )             37.6         MEDICATIONS  (STANDING):  acetaminophen   Tablet .. 975 milliGRAM(s) Oral <User Schedule>  dextrose 5% + lactated ringers. 1000 milliLiter(s) (125 mL/Hr) IV Continuous <Continuous>  diphtheria/tetanus/pertussis (acellular) Vaccine (ADAcel) 0.5 milliLiter(s) IntraMuscular once  ibuprofen  Tablet. 600 milliGRAM(s) Oral every 6 hours  oxytocin Infusion 333.333 milliUNIT(s)/Min (1000 mL/Hr) IV Continuous <Continuous>  oxytocin Infusion 333.333 milliUNIT(s)/Min (1000 mL/Hr) IV Continuous <Continuous>  oxytocin Infusion 2 milliUNIT(s)/Min (2 mL/Hr) IV Continuous <Continuous>  prenatal multivitamin 1 Tablet(s) Oral daily  sodium chloride 0.9% lock flush 3 milliLiter(s) IV Push every 8 hours    MEDICATIONS  (PRN):  benzocaine 20%/menthol 0.5% Spray 1 Spray(s) Topical every 6 hours PRN for Perineal discomfort  dibucaine 1% Ointment 1 Application(s) Topical every 6 hours PRN Perineal discomfort  diphenhydrAMINE 25 milliGRAM(s) Oral every 6 hours PRN Pruritus  glycerin Suppository - Adult 1 Suppository(s) Rectal at bedtime PRN Constipation  hydrocortisone 1% Cream 1 Application(s) Topical every 6 hours PRN Moderate Pain (4-6)  lanolin Ointment 1 Application(s) Topical every 6 hours PRN nipple soreness  magnesium hydroxide Suspension 30 milliLiter(s) Oral two times a day PRN Constipation  oxyCODONE    IR 5 milliGRAM(s) Oral every 3 hours PRN Moderate to Severe Pain (4-10)  oxyCODONE    IR 5 milliGRAM(s) Oral once PRN Moderate to Severe Pain (4-10)  pramoxine 1%/zinc 5% Cream 1 Application(s) Topical every 4 hours PRN Moderate Pain (4-6)  simethicone 80 milliGRAM(s) Chew every 4 hours PRN Gas  witch hazel Pads 1 Application(s) Topical every 4 hours PRN Perineal discomfort

## 2020-01-28 NOTE — PROGRESS NOTE ADULT - ATTENDING COMMENTS
DOING WELL W/O COMPL.  VSS  FIRM FUNDUS  Hb= 10.7  PPD#1 STABLE  ROUTINE PP CARE  CIRC CARE DISCUSSED    J MADAY

## 2020-01-28 NOTE — PROGRESS NOTE ADULT - PROBLEM SELECTOR PLAN 1
Plan:   - Increase ambulation, SCDs when not ambulating  - Continue PO pain regimen: Ibuprofen, Acetaminophen with Oxycodone PRN   - Continue regular diet  - PPD1 H/H this AM   - Discharge planning    Donato, PGY-1

## 2020-01-29 ENCOUNTER — TRANSCRIPTION ENCOUNTER (OUTPATIENT)
Age: 30
End: 2020-01-29

## 2020-01-29 VITALS
HEART RATE: 61 BPM | TEMPERATURE: 98 F | OXYGEN SATURATION: 98 % | SYSTOLIC BLOOD PRESSURE: 108 MMHG | DIASTOLIC BLOOD PRESSURE: 69 MMHG | RESPIRATION RATE: 18 BRPM

## 2020-01-29 PROCEDURE — 85018 HEMOGLOBIN: CPT

## 2020-01-29 PROCEDURE — 86901 BLOOD TYPING SEROLOGIC RH(D): CPT

## 2020-01-29 PROCEDURE — 85014 HEMATOCRIT: CPT

## 2020-01-29 PROCEDURE — 90707 MMR VACCINE SC: CPT

## 2020-01-29 PROCEDURE — 85027 COMPLETE CBC AUTOMATED: CPT

## 2020-01-29 PROCEDURE — 59050 FETAL MONITOR W/REPORT: CPT

## 2020-01-29 PROCEDURE — 59025 FETAL NON-STRESS TEST: CPT

## 2020-01-29 PROCEDURE — G0463: CPT

## 2020-01-29 PROCEDURE — 86900 BLOOD TYPING SEROLOGIC ABO: CPT

## 2020-01-29 PROCEDURE — 86850 RBC ANTIBODY SCREEN: CPT

## 2020-01-29 PROCEDURE — 86780 TREPONEMA PALLIDUM: CPT

## 2020-01-29 RX ORDER — ACETAMINOPHEN 500 MG
3 TABLET ORAL
Qty: 0 | Refills: 0 | DISCHARGE
Start: 2020-01-29

## 2020-01-29 RX ORDER — IBUPROFEN 200 MG
1 TABLET ORAL
Qty: 0 | Refills: 0 | DISCHARGE
Start: 2020-01-29

## 2020-01-29 RX ADMIN — Medication 600 MILLIGRAM(S): at 05:57

## 2020-01-29 RX ADMIN — Medication 600 MILLIGRAM(S): at 06:27

## 2020-01-29 RX ADMIN — Medication 975 MILLIGRAM(S): at 03:17

## 2020-01-29 RX ADMIN — Medication 600 MILLIGRAM(S): at 12:15

## 2020-01-29 RX ADMIN — Medication 600 MILLIGRAM(S): at 01:18

## 2020-01-29 RX ADMIN — Medication 975 MILLIGRAM(S): at 02:47

## 2020-01-29 RX ADMIN — Medication 600 MILLIGRAM(S): at 00:48

## 2020-01-29 RX ADMIN — Medication 600 MILLIGRAM(S): at 11:41

## 2020-01-29 RX ADMIN — Medication 975 MILLIGRAM(S): at 09:55

## 2020-01-29 RX ADMIN — Medication 1 TABLET(S): at 11:41

## 2020-01-29 RX ADMIN — Medication 0.5 MILLILITER(S): at 10:33

## 2020-01-29 RX ADMIN — Medication 975 MILLIGRAM(S): at 09:00

## 2020-01-29 NOTE — DISCHARGE NOTE OB - CARE PLAN
Principal Discharge DX:	Vaginal delivery  Goal:	Routine postpartum care  Assessment and plan of treatment:	Please call office for 6 week postpartum visit.

## 2020-01-29 NOTE — DISCHARGE NOTE OB - CARE PROVIDER_API CALL
Monica Guerrier (DO)  [facility] [address], [address]  [place] [id number]  Phone: [phone]  Fax: [phone]  Follow Up Time:

## 2020-01-29 NOTE — DISCHARGE NOTE OB - MEDICATION SUMMARY - MEDICATIONS TO TAKE
I will START or STAY ON the medications listed below when I get home from the hospital:    acetaminophen 325 mg oral tablet  -- 3 tab(s) by mouth   -- Indication: For pain    ibuprofen 600 mg oral tablet  -- 1 tab(s) by mouth every 6 hours  -- Indication: For pain    Prenatal Multivitamins with Folic Acid 1 mg oral tablet  -- 1 tab(s) by mouth once a day  -- Indication: For routine postpartum care

## 2020-01-29 NOTE — PROGRESS NOTE ADULT - SUBJECTIVE AND OBJECTIVE BOX
OB Attending Note    S: Patient doing well. Minimal lochia. Pain controlled.    O: Vital Signs Last 24 Hrs  T(C): 36.5 (29 Jan 2020 09:51), Max: 36.8 (28 Jan 2020 17:35)  T(F): 97.7 (29 Jan 2020 09:51), Max: 98.2 (28 Jan 2020 17:35)  HR: 61 (29 Jan 2020 09:51) (50 - 62)  BP: 108/69 (29 Jan 2020 09:51) (107/69 - 113/72)  BP(mean): --  RR: 18 (29 Jan 2020 09:51) (18 - 18)  SpO2: 98% (29 Jan 2020 09:51) (98% - 98%)    Gen: NAD  Abd: soft, NT, ND, fundus firm below umbilicus  Lochia: min  Perineum healing well  Ext: no tenderness    Labs:                        10.7   x     )-----------( x        ( 28 Jan 2020 08:00 )             31.0

## 2020-01-29 NOTE — DISCHARGE NOTE OB - SWOLLEN AREA ON THE LEG THAT IS PAINFUL, RED OR HOT
Add 75204 Cpt? (Important Note: In 2017 The Use Of 23337 Is Being Tracked By Cms To Determine Future Global Period Reimbursement For Global Periods): no
Body Location Override (Optional - Billing Will Still Be Based On Selected Body Map Location If Applicable): right central lat neck
Detail Level: Detailed
Statement Selected

## 2020-01-29 NOTE — DISCHARGE NOTE OB - PATIENT PORTAL LINK FT
You can access the FollowMyHealth Patient Portal offered by [facility] by registering at the following website: [email or web address]. By joining [employer]’s FollowMyHealth portal, you will also be able to view your health information using other applications (apps) compatible with our system.

## 2020-01-29 NOTE — DISCHARGE NOTE OB - HOSPITAL COURSE
Patient admitted in labor and had an uncomplicated vaginal delivery.  Patient had an unremarkable postpartum course and was stable for discharge home on postpartum day 2. MMR offered for measles nonimmunity.

## 2020-01-29 NOTE — PROGRESS NOTE ADULT - ASSESSMENT
PPD2 s/p , doing well   -Discharge home   -Precautions given   -Follow-up in office in 6 weeks      Traci Landa MD

## 2020-01-29 NOTE — DISCHARGE NOTE OB - MATERIALS PROVIDED
[facility] [place] Screening Program/Breastfeeding Guide and Packet/[place]  Immunization Record/Breastfeeding Log/Breastfeeding Mother’s Support Group Information/Guide to Postpartum Care/Back To Sleep Handout

## 2020-03-09 ENCOUNTER — RESULT REVIEW (OUTPATIENT)
Age: 30
End: 2020-03-09

## 2021-04-19 ENCOUNTER — RESULT REVIEW (OUTPATIENT)
Age: 31
End: 2021-04-19

## 2022-02-10 ENCOUNTER — APPOINTMENT (OUTPATIENT)
Dept: ANTEPARTUM | Facility: CLINIC | Age: 32
End: 2022-02-10
Payer: COMMERCIAL

## 2022-02-10 ENCOUNTER — ASOB RESULT (OUTPATIENT)
Age: 32
End: 2022-02-10

## 2022-02-10 PROCEDURE — 76811 OB US DETAILED SNGL FETUS: CPT

## 2022-02-18 ENCOUNTER — APPOINTMENT (OUTPATIENT)
Dept: ANTEPARTUM | Facility: CLINIC | Age: 32
End: 2022-02-18
Payer: COMMERCIAL

## 2022-02-18 ENCOUNTER — ASOB RESULT (OUTPATIENT)
Age: 32
End: 2022-02-18

## 2022-02-18 PROCEDURE — 76816 OB US FOLLOW-UP PER FETUS: CPT

## 2022-03-16 NOTE — DISCHARGE NOTE OB - NS OB DC IMMUNIZATIONS2 YN
Arava Pregnancy And Lactation Text: This medication is Pregnancy Category X and is absolutely contraindicated during pregnancy. It is unknown if it is excreted in breast milk. Yes

## 2022-03-30 ENCOUNTER — TRANSCRIPTION ENCOUNTER (OUTPATIENT)
Age: 32
End: 2022-03-30

## 2022-03-31 ENCOUNTER — EMERGENCY (EMERGENCY)
Facility: HOSPITAL | Age: 32
LOS: 1 days | Discharge: HOME | End: 2022-03-31
Attending: STUDENT IN AN ORGANIZED HEALTH CARE EDUCATION/TRAINING PROGRAM
Payer: COMMERCIAL

## 2022-03-31 VITALS
DIASTOLIC BLOOD PRESSURE: 67 MMHG | HEIGHT: 65 IN | SYSTOLIC BLOOD PRESSURE: 111 MMHG | TEMPERATURE: 98 F | HEART RATE: 86 BPM | RESPIRATION RATE: 20 BRPM | WEIGHT: 156.97 LBS | OXYGEN SATURATION: 100 %

## 2022-03-31 PROCEDURE — 99284 EMERGENCY DEPT VISIT MOD MDM: CPT

## 2022-03-31 NOTE — ED ADULT TRIAGE NOTE - CHIEF COMPLAINT QUOTE
no itching/no dryness/no rash
Pt c/o 'chest/nasal congestion, difficulty breathing, ear pain. 29 weeks pregnant. My family has been sick"

## 2022-04-01 VITALS
TEMPERATURE: 98 F | OXYGEN SATURATION: 96 % | DIASTOLIC BLOOD PRESSURE: 60 MMHG | RESPIRATION RATE: 17 BRPM | HEART RATE: 66 BPM | SYSTOLIC BLOOD PRESSURE: 121 MMHG

## 2022-04-01 LAB
FLUAV AG NPH QL: DETECTED
FLUBV AG NPH QL: SIGNIFICANT CHANGE UP
RSV RNA NPH QL NAA+NON-PROBE: SIGNIFICANT CHANGE UP
SARS-COV-2 RNA SPEC QL NAA+PROBE: SIGNIFICANT CHANGE UP

## 2022-04-01 PROCEDURE — 71045 X-RAY EXAM CHEST 1 VIEW: CPT

## 2022-04-01 PROCEDURE — 87637 SARSCOV2&INF A&B&RSV AMP PRB: CPT

## 2022-04-01 PROCEDURE — 71045 X-RAY EXAM CHEST 1 VIEW: CPT | Mod: 26

## 2022-04-01 PROCEDURE — 99283 EMERGENCY DEPT VISIT LOW MDM: CPT | Mod: 25

## 2022-04-01 NOTE — ED ADULT NURSE NOTE - CHIEF COMPLAINT QUOTE
Pt c/o 'chest/nasal congestion, difficulty breathing, ear pain. 29 weeks pregnant. My family has been sick"

## 2022-04-01 NOTE — ED PROVIDER NOTE - ATTENDING CONTRIBUTION TO CARE
I, Ashley Hickey, performed a history and physical exam of the patient and discussed their management with the resident and /or advanced care provider. I reviewed the resident and /or ACP's note and agree with the documented findings and plan of care except where otherwise noted in my note. I was present and available for all procedures.     30 yo F no PMH,  28 weeks pregnant p/w rhinorrhea, b/l ear pain, productive cough x 6 days with shortness of breath today. also endorsing chest discomfort. Children have similar symptoms. She had a viral swab 2 days ago at urgent care which was negative for flu and covid. No abdominal pain, no vaginal bleeding.    PHYSICAL EXAM:   General: nontoxic appearing  HEENT: NC/AT,  airway patent, bilateral TM dull but +light reflex  Cardiovascular: regular rate and rhythm, + S1/S2, no murmurs, rubs, gallops appreciated  Respiratory: clear to auscultation bilaterally, good aeration bilaterally, nonlabored respirations  Abdominal: soft, nontender, +gravid, no rebound, guarding or rigidity  Extremities: no LE edema, discoloration or calf tenderness b/l. Radial pulses equal and strong b/l  Neuro: awake, alert, interactive  Psychiatric: appropriate mood and affect.   -Ashley Hickey MD Attending Physician     Concern for viral illness. Xray obtained to eval for superimposed PNA. RVP swab sent.   Symptoms likely 2/2 influenza at this point given +RVP. Low suspicion PE given no tachycardia, no hypoxia, no LE edema/discoloration and patient has alternative cause for symptoms  Discussed need for re-evaluation should symptoms persist (see progress note for phone conversation with )

## 2022-04-01 NOTE — ED PROVIDER NOTE - PHYSICAL EXAMINATION
General: NAD  HEENT: NCAT, PERRL  Cardiac: RRR, 2+ peripheral pulses  Chest: CTA  Abdomen: soft, non-distended, bowel sounds present, no ttp, no rebound or guarding +gravid w/ palpable fundus above umbilicus  Extremities: no peripheral edema, calf tenderness, or leg size discrepancies  Skin: no rashes  Neuro: AAOx3, motor and sensory grossly intact  Psych: mood and affect appropriate

## 2022-04-01 NOTE — ED PROVIDER NOTE - OBJECTIVE STATEMENT
32yo F  @28weeks here for viral illness. Since 6 days ago, having body aches, cough, runny nose, bilateral ear pain. On day 1 went to  and had negative flu/covid swab. 5yo son and  also w/ similar symptoms. Pt woke up w/ sob. Denies fever, abdominal pain, vaginal bleeding, cp, urinary changes. She is vaccinated and boosted.

## 2022-04-01 NOTE — ED PROVIDER NOTE - PATIENT PORTAL LINK FT
You can access the FollowMyHealth Patient Portal offered by [facility] by registering at the following website: [email or web address]. By joining [employer]’s FollowMyHealth portal, you will also be able to view your health information using other applications (apps) compatible with our system.

## 2022-04-01 NOTE — CHART NOTE - NSCHARTNOTEFT_GEN_A_CORE
NST reviewed:    Baseline 150bpm, mod thor, +10x10 accels, -decels  [place] Neg    Reactive NST.     RFrankel PGY3

## 2022-04-01 NOTE — ED PROVIDER NOTE - NSFOLLOWUPINSTRUCTIONS_ED_ALL_ED_FT
You were seen in the Emergency Department for cough. You have a viral illness. Your xray did not show any emergent findings. Please follow up with your primary care doctor. You can take acetaminophen for fever and pains.      1) Continue all previously prescribed medications as directed.    2) Follow up with your primary care physician - take copies of your results.    3) Return to the Emergency Department for worsening or persistent symptoms, and/or ANY NEW OR CONCERNING SYMPTOMS.

## 2022-04-01 NOTE — ED PROVIDER NOTE - CLINICAL SUMMARY MEDICAL DECISION MAKING FREE TEXT BOX
Pt w/ viral illness and pregnant. VSS. Exam w/ rhinorrhea and clear lungs. Low suspicion of PE as there is more likely diagnosis and she is cp free. CXR for consolidations/bacterial pna, swab. anticipate dc.

## 2022-04-02 NOTE — ED POST DISCHARGE NOTE - DETAILS
4/2/22: Attempt #1 LVM with admin # for callback. will reattempt - Silvio Ballard PA-C 4/3/22: Left voicemail informing that I had results to discuss with the patient, gave call back number. -Matt Biggs PA-C 4/4/22: Pt contacted. States she was made aware of results already. Has been following closely with her OB. Reiterated strict return precautions. Pt acknowledged understanding, all questions answered, appreciative of call. - Arnaldo Herrera PA-C.

## 2022-05-09 ENCOUNTER — INPATIENT (INPATIENT)
Facility: HOSPITAL | Age: 32
LOS: 0 days | Discharge: HOME | DRG: 833 | End: 2022-05-10
Attending: STUDENT IN AN ORGANIZED HEALTH CARE EDUCATION/TRAINING PROGRAM | Admitting: STUDENT IN AN ORGANIZED HEALTH CARE EDUCATION/TRAINING PROGRAM
Payer: COMMERCIAL

## 2022-05-09 VITALS — SYSTOLIC BLOOD PRESSURE: 113 MMHG | DIASTOLIC BLOOD PRESSURE: 65 MMHG | HEART RATE: 80 BPM

## 2022-05-09 DIAGNOSIS — Z34.80 ENCOUNTER FOR SUPERVISION OF OTHER NORMAL PREGNANCY, UNSPECIFIED TRIMESTER: ICD-10-CM

## 2022-05-09 DIAGNOSIS — Z3A.00 WEEKS OF GESTATION OF PREGNANCY NOT SPECIFIED: ICD-10-CM

## 2022-05-09 DIAGNOSIS — O26.899 OTHER SPECIFIED PREGNANCY RELATED CONDITIONS, UNSPECIFIED TRIMESTER: ICD-10-CM

## 2022-05-09 LAB
ALBUMIN SERPL ELPH-MCNC: 3.5 G/DL — SIGNIFICANT CHANGE UP (ref 3.3–5)
ALP SERPL-CCNC: 63 U/L — SIGNIFICANT CHANGE UP (ref 40–120)
ALT FLD-CCNC: 8 U/L — LOW (ref 10–45)
ANION GAP SERPL CALC-SCNC: 14 MMOL/L — SIGNIFICANT CHANGE UP (ref 5–17)
APPEARANCE UR: CLEAR — SIGNIFICANT CHANGE UP
APTT BLD: 26.7 SEC — LOW (ref 27.5–35.5)
AST SERPL-CCNC: 17 U/L — SIGNIFICANT CHANGE UP (ref 10–40)
BACTERIA # UR AUTO: NEGATIVE — SIGNIFICANT CHANGE UP
BASOPHILS # BLD AUTO: 0.03 K/UL — SIGNIFICANT CHANGE UP (ref 0–0.2)
BASOPHILS NFR BLD AUTO: 0.3 % — SIGNIFICANT CHANGE UP (ref 0–2)
BILIRUB SERPL-MCNC: 0.3 MG/DL — SIGNIFICANT CHANGE UP (ref 0.2–1.2)
BILIRUB UR-MCNC: NEGATIVE — SIGNIFICANT CHANGE UP
BUN SERPL-MCNC: 5 MG/DL — LOW (ref 7–23)
CALCIUM SERPL-MCNC: 8.6 MG/DL — SIGNIFICANT CHANGE UP (ref 8.4–10.5)
CHLORIDE SERPL-SCNC: 105 MMOL/L — SIGNIFICANT CHANGE UP (ref 96–108)
CO2 SERPL-SCNC: 18 MMOL/L — LOW (ref 22–31)
COLOR SPEC: COLORLESS — SIGNIFICANT CHANGE UP
CREAT SERPL-MCNC: 0.45 MG/DL — LOW (ref 0.5–1.3)
DIFF PNL FLD: NEGATIVE — SIGNIFICANT CHANGE UP
EGFR: 130 ML/MIN/1.73M2 — SIGNIFICANT CHANGE UP
EOSINOPHIL # BLD AUTO: 0.03 K/UL — SIGNIFICANT CHANGE UP (ref 0–0.5)
EOSINOPHIL NFR BLD AUTO: 0.3 % — SIGNIFICANT CHANGE UP (ref 0–6)
EPI CELLS # UR: 0 /HPF — SIGNIFICANT CHANGE UP
FIBRINOGEN PPP-MCNC: 517 MG/DL — SIGNIFICANT CHANGE UP (ref 330–520)
GLUCOSE SERPL-MCNC: 88 MG/DL — SIGNIFICANT CHANGE UP (ref 70–99)
GLUCOSE UR QL: NEGATIVE — SIGNIFICANT CHANGE UP
HCT VFR BLD CALC: 33.4 % — LOW (ref 34.5–45)
HGB BLD-MCNC: 11 G/DL — LOW (ref 11.5–15.5)
HYALINE CASTS # UR AUTO: 0 /LPF — SIGNIFICANT CHANGE UP (ref 0–2)
IMM GRANULOCYTES NFR BLD AUTO: 0.7 % — SIGNIFICANT CHANGE UP (ref 0–1.5)
KETONES UR-MCNC: NEGATIVE — SIGNIFICANT CHANGE UP
LEUKOCYTE ESTERASE UR-ACNC: NEGATIVE — SIGNIFICANT CHANGE UP
LYMPHOCYTES # BLD AUTO: 1.53 K/UL — SIGNIFICANT CHANGE UP (ref 1–3.3)
LYMPHOCYTES # BLD AUTO: 14.7 % — SIGNIFICANT CHANGE UP (ref 13–44)
MCHC RBC-ENTMCNC: 30.3 PG — SIGNIFICANT CHANGE UP (ref 27–34)
MCHC RBC-ENTMCNC: 32.9 GM/DL — SIGNIFICANT CHANGE UP (ref 32–36)
MCV RBC AUTO: 92 FL — SIGNIFICANT CHANGE UP (ref 80–100)
MONOCYTES # BLD AUTO: 0.15 K/UL — SIGNIFICANT CHANGE UP (ref 0–0.9)
MONOCYTES NFR BLD AUTO: 1.4 % — LOW (ref 2–14)
NEUTROPHILS # BLD AUTO: 8.57 K/UL — HIGH (ref 1.8–7.4)
NEUTROPHILS NFR BLD AUTO: 82.6 % — HIGH (ref 43–77)
NITRITE UR-MCNC: NEGATIVE — SIGNIFICANT CHANGE UP
NRBC # BLD: 0 /100 WBCS — SIGNIFICANT CHANGE UP (ref 0–0)
PH UR: 6.5 — SIGNIFICANT CHANGE UP (ref 5–8)
PLATELET # BLD AUTO: 237 K/UL — SIGNIFICANT CHANGE UP (ref 150–400)
POTASSIUM SERPL-MCNC: 3.7 MMOL/L — SIGNIFICANT CHANGE UP (ref 3.5–5.3)
POTASSIUM SERPL-SCNC: 3.7 MMOL/L — SIGNIFICANT CHANGE UP (ref 3.5–5.3)
PROT SERPL-MCNC: 6 G/DL — SIGNIFICANT CHANGE UP (ref 6–8.3)
PROT UR-MCNC: NEGATIVE — SIGNIFICANT CHANGE UP
RBC # BLD: 3.63 M/UL — LOW (ref 3.8–5.2)
RBC # FLD: 12.4 % — SIGNIFICANT CHANGE UP (ref 10.3–14.5)
RBC CASTS # UR COMP ASSIST: 0 /HPF — SIGNIFICANT CHANGE UP (ref 0–4)
SARS-COV-2 RNA SPEC QL NAA+PROBE: SIGNIFICANT CHANGE UP
SODIUM SERPL-SCNC: 137 MMOL/L — SIGNIFICANT CHANGE UP (ref 135–145)
SP GR SPEC: 1 — LOW (ref 1.01–1.02)
URATE SERPL-MCNC: 4.1 MG/DL — SIGNIFICANT CHANGE UP (ref 2.5–7)
UROBILINOGEN FLD QL: NEGATIVE — SIGNIFICANT CHANGE UP
WBC # BLD: 10.38 K/UL — SIGNIFICANT CHANGE UP (ref 3.8–10.5)
WBC # FLD AUTO: 10.38 K/UL — SIGNIFICANT CHANGE UP (ref 3.8–10.5)
WBC UR QL: 0 /HPF — SIGNIFICANT CHANGE UP (ref 0–5)

## 2022-05-09 RX ORDER — FOLIC ACID 0.8 MG
1 TABLET ORAL DAILY
Refills: 0 | Status: DISCONTINUED | OUTPATIENT
Start: 2022-05-09 | End: 2022-05-10

## 2022-05-09 RX ORDER — FERROUS SULFATE 325(65) MG
325 TABLET ORAL DAILY
Refills: 0 | Status: DISCONTINUED | OUTPATIENT
Start: 2022-05-09 | End: 2022-05-09

## 2022-05-09 RX ORDER — SODIUM CHLORIDE 9 MG/ML
1000 INJECTION, SOLUTION INTRAVENOUS
Refills: 0 | Status: DISCONTINUED | OUTPATIENT
Start: 2022-05-09 | End: 2022-05-09

## 2022-05-09 RX ORDER — ACETAMINOPHEN 500 MG
1000 TABLET ORAL ONCE
Refills: 0 | Status: DISCONTINUED | OUTPATIENT
Start: 2022-05-09 | End: 2022-05-10

## 2022-05-09 RX ORDER — SODIUM CHLORIDE 9 MG/ML
1000 INJECTION, SOLUTION INTRAVENOUS
Refills: 0 | Status: DISCONTINUED | OUTPATIENT
Start: 2022-05-09 | End: 2022-05-10

## 2022-05-09 RX ORDER — SODIUM CHLORIDE 9 MG/ML
1000 INJECTION, SOLUTION INTRAVENOUS ONCE
Refills: 0 | Status: COMPLETED | OUTPATIENT
Start: 2022-05-09 | End: 2022-05-09

## 2022-05-09 RX ORDER — FOLIC ACID 0.8 MG
1 TABLET ORAL DAILY
Refills: 0 | Status: DISCONTINUED | OUTPATIENT
Start: 2022-05-09 | End: 2022-05-09

## 2022-05-09 RX ORDER — FERROUS SULFATE 325(65) MG
325 TABLET ORAL DAILY
Refills: 0 | Status: DISCONTINUED | OUTPATIENT
Start: 2022-05-09 | End: 2022-05-10

## 2022-05-09 RX ADMIN — SODIUM CHLORIDE 1000 MILLILITER(S): 9 INJECTION, SOLUTION INTRAVENOUS at 21:30

## 2022-05-09 RX ADMIN — Medication 12 MILLIGRAM(S): at 17:15

## 2022-05-09 NOTE — OB PROVIDER TRIAGE NOTE - NSOBPROVIDERNOTE_OBGYN_ALL_OB_FT
32 y/o  @ 33.3 weeks presenting to L&D for r/o pre-term contractions vs. pre-term labor  -will continue monitoring for approx 4 hrs and a re-exam  -u/a, urine culture  -betamethasone 12 mg x 24 hrs for 2 doses    d/w Dr. Armando Tillman NP 32 y/o  @ 33.3 weeks presenting to L&D for r/o pre-term contractions vs. pre-term labor  -will continue monitoring for approx 4 hrs and a re-exam  -u/a, urine culture  -betamethasone 12 mg x 24 hrs for 2 doses    d/w Dr. Armando Tillman NP    Pt seen in triage with Jill Tillman, NP  Pt appears comfortable but reports feeling ctx about every 2-5 minutes  She went for a 3 mile jog today.  No LOF, no VB. + FM  NST:  moderate variability, no decels, category 1  toco: ctx q 2-5 min  I did not reexamine the patient  A/P:  labor vs  ctx-pt has documented cervical change since last week  Steroids ordered  Will monitor and recheck in 4 hours to see if any cervical change. IF continued change would need tocolytic  Christine Gibson MD

## 2022-05-09 NOTE — OB PROVIDER TRIAGE NOTE - NS_OBGYNHISTORY_OBGYN_ALL_OB_FT
CARDIOVASCULAR - ADULT    • Maintains optimal cardiac output and hemodynamic stability Progressing        GASTROINTESTINAL - ADULT    • Minimal or absence of nausea and vomiting Progressing    • Maintains or returns to baseline bowel function Progressing  x2  appendectomy  tonsillectomy  knee sx

## 2022-05-09 NOTE — OB PROVIDER H&P - ATTENDING COMMENTS
33 3/7 weeks with cervical change from closed to 1 cm in 1 week. She c/o cramping which has been intermittent but more consistent today  Repeat exam after 4 hours was unchanged however with the change in exam over 1 week, I decided to admit pt. She will have GBS collected and intermittent monitoring per antepartum testing and also prn. BMZ given already and Repeat BMZ tomorrow, Repeat VE exam as indicated by symptoms.  Case d/w thrid year resident Mei Harrison. Agree with transfer to antepartum as the exam didn't change in 4 hours with precautions for pt to call if increased pain or inability to sleep from the pain  Christine Roberts MD

## 2022-05-09 NOTE — OB RN PATIENT PROFILE - FALL HARM RISK - UNIVERSAL INTERVENTIONS
Bed in lowest position, wheels locked, appropriate side rails in place/Call bell, personal items and telephone in reach/Instruct patient to call for assistance before getting out of bed or chair/Non-slip footwear when patient is out of bed/[place] to call system/Physically safe environment - no spills, clutter or unnecessary equipment/Purposeful Proactive Rounding/Room/bathroom lighting operational, light cord in reach

## 2022-05-09 NOTE — OB RN PATIENT PROFILE - BRAND OF COVID-19 VACCINATION
Group Topic: BH Behavioral Activation Group    Date: 2/17/2022  Start Time: 0945  End Time: 1030  Facilitators: Radha Washburn LPC    Focus:  Daily Meditation Reading & Reflection  Number in attendance: 4    Patient was not present for group, despite recruitment. Efforts to encourage participation in subsequent sessions throughout the day will be encouraged.    Method: Group  Attendance: Absent    Radha Washburn M.S.Ed, LPC, NCC, CEDS  2/17/2022         Radha

## 2022-05-09 NOTE — OB PROVIDER TRIAGE NOTE - HISTORY OF PRESENT ILLNESS
The patient is a 32 y/o  EDC 2022 @ 33.3 weeks presenting to L&D from the office with consistent contractions Q10-20 mins starting at 12p today. The patient was examined and found to be 1/50/-3 intact examined by Dr. Guerrier.  The patient had been examined by Dr. Roberts 1 week prior and found to be closed/long/-3. The patient denies LOF, VB. endorses good fetal movement. The patient accepts all blood products    allergies: nkda  meds: PNV  PNC: uncomplicated    Medhx: pt denies cardiac, pulm, heme, GI, neuro  Obhx: 2018  male 8 lbs 10 oz delivery @ 42 weeks    male 7 lbs 3oz uncomplicated  Sxhx:  open appendectomy  2006 knee sx   tonsillectomy  Gynhx: pt denies cysts, fibroids, abn. pap, STDs  Sochx: pt denies  Famhx: pt denies

## 2022-05-09 NOTE — OB PROVIDER TRIAGE NOTE - NSHPPHYSICALEXAM_GEN_ALL_CORE
ICU Vital Signs Last 24 Hrs  T(C): 37.2 (09 May 2022 15:55), Max: 37.2 (09 May 2022 15:54)  T(F): 99 (09 May 2022 15:55), Max: 99 (09 May 2022 15:55)  HR: 71 (09 May 2022 17:13) (63 - 90)  BP: 113/65 (09 May 2022 15:55) (113/65 - 113/65)  BP(mean): --  ABP: --  ABP(mean): --  RR: 18 (09 May 2022 15:55) (18 - 18)  SpO2: 97% (09 May 2022 17:13) (95% - 97%)    gen: A&Ox3  CV: rrr s1s2  abd: gravid soft  VE: 1/60/-3 intact  EFM: 135 moderate variability, + acels, -decels  toco: Q2-4mins

## 2022-05-09 NOTE — OB PROVIDER H&P - NSHPPHYSICALEXAM_GEN_ALL_CORE
VS  T(C): 37.2 (05-09-22 @ 15:55)  HR: 71 (05-09-22 @ 21:42)  BP: 118/65 (05-09-22 @ 20:17)  RR: 18 (05-09-22 @ 15:55)  SpO2: 96% (05-09-22 @ 21:42)    Gen: A&Ox3 NAD well appearing  CV: RRR  Pulm: Respirations even, unlabored  Abd: Soft, gravid, nontender  Ext: Full ROM     SVE: 1/long/-3, unchanged

## 2022-05-09 NOTE — OB PROVIDER H&P - HISTORY OF PRESENT ILLNESS
The patient is a 32 y/o  EDC 2022 @ 33.3 weeks presenting to L&D from the office with consistent contractions Q10-20 mins starting at 12p today. The patient was examined and found to be /-3 intact examined by Dr. Guerrier.  The patient had been examined by Dr. Roberts 1 week prior and found to be closed/long/-3. The patient denies LOF, VB. endorses good fetal movement. The patient accepts all blood products     Throughout the day in triage she has continued to experience cramping contraction pain. Her SVE has remained unchanged, /-3.     allergies: nkda  meds: PNV  PNC: uncomplicated    Medhx: pt denies cardiac, pulm, heme, GI, neuro  Obhx: 2018  male 8 lbs 10 oz delivery @ 42 weeks    male 7 lbs 3oz uncomplicated  Sxhx:  open appendectomy   knee sx   tonsillectomy  Gynhx: pt denies cysts, fibroids, abn. pap, STDs  Sochx: pt denies  Famhx: pt denies

## 2022-05-09 NOTE — OB PROVIDER H&P - ASSESSMENT
31y  at 33w3d presenting with regular contractions for r/o PTL. Patient was noted to make cervical change from closed 1w ago to 1cm today, however SVE now stable over several hours. Continuing to endorse q2 minute contractions of increasing discomfort. To be admitted to antepartum for BMZ and observation overnight.     # contractions    - LR bolus, maintenance IVF   - sp IV Tylenol x1    - SVE unchanged since presentation to L&D (/-3)     #Fetal wellbeing   - Vtx on TAUS   - NST BID  - PNV  - BMZ (-)     #Maternal wellbeing   - SCDs, OOB for DVT ppx   - Regular diet   - Fe      dw Dr. Armando Sewell, PGY-3

## 2022-05-10 ENCOUNTER — TRANSCRIPTION ENCOUNTER (OUTPATIENT)
Age: 32
End: 2022-05-10

## 2022-05-10 VITALS
SYSTOLIC BLOOD PRESSURE: 109 MMHG | HEART RATE: 65 BPM | TEMPERATURE: 98 F | OXYGEN SATURATION: 100 % | RESPIRATION RATE: 18 BRPM | DIASTOLIC BLOOD PRESSURE: 67 MMHG

## 2022-05-10 LAB
CULTURE RESULTS: SIGNIFICANT CHANGE UP
SPECIMEN SOURCE: SIGNIFICANT CHANGE UP

## 2022-05-10 PROCEDURE — 86900 BLOOD TYPING SEROLOGIC ABO: CPT

## 2022-05-10 PROCEDURE — 85730 THROMBOPLASTIN TIME PARTIAL: CPT

## 2022-05-10 PROCEDURE — 87086 URINE CULTURE/COLONY COUNT: CPT

## 2022-05-10 PROCEDURE — 81001 URINALYSIS AUTO W/SCOPE: CPT

## 2022-05-10 PROCEDURE — 36415 COLL VENOUS BLD VENIPUNCTURE: CPT

## 2022-05-10 PROCEDURE — 86850 RBC ANTIBODY SCREEN: CPT

## 2022-05-10 PROCEDURE — 85025 COMPLETE CBC W/AUTO DIFF WBC: CPT

## 2022-05-10 PROCEDURE — 86901 BLOOD TYPING SEROLOGIC RH(D): CPT

## 2022-05-10 PROCEDURE — U0003: CPT

## 2022-05-10 PROCEDURE — 87653 STREP B DNA AMP PROBE: CPT

## 2022-05-10 PROCEDURE — 59025 FETAL NON-STRESS TEST: CPT

## 2022-05-10 PROCEDURE — 80053 COMPREHEN METABOLIC PANEL: CPT

## 2022-05-10 PROCEDURE — 85384 FIBRINOGEN ACTIVITY: CPT

## 2022-05-10 PROCEDURE — U0005: CPT

## 2022-05-10 PROCEDURE — G0463: CPT

## 2022-05-10 PROCEDURE — 84550 ASSAY OF BLOOD/URIC ACID: CPT

## 2022-05-10 RX ADMIN — Medication 12 MILLIGRAM(S): at 17:16

## 2022-05-10 RX ADMIN — Medication 325 MILLIGRAM(S): at 12:07

## 2022-05-10 RX ADMIN — Medication 1 MILLIGRAM(S): at 12:07

## 2022-05-10 RX ADMIN — Medication 1 TABLET(S): at 12:07

## 2022-05-10 NOTE — DISCHARGE NOTE ANTEPARTUM - PATIENT PORTAL LINK FT
You can access the FollowMyHealth Patient Portal offered by [facility] by registering at the following website: [email or web address]. By joining [employer]’s FollowMyHealth portal, you will also be able to view your health information using other applications (apps) compatible with our system.

## 2022-05-10 NOTE — DISCHARGE NOTE ANTEPARTUM - PROVIDER TOKENS
PROVIDER:[TOKEN:[[id number]],FOLLOWUP:[1 week]] High Dose Vitamin A Pregnancy And Lactation Text: High dose vitamin A therapy is contraindicated during pregnancy and breast feeding.

## 2022-05-10 NOTE — DISCHARGE NOTE ANTEPARTUM - CARE PROVIDER_API CALL
Traci Landa)  Obstetrics and Gynecology  [address], [address]  [place] [address]  Phone: [phone]  Fax: [phone]  Follow Up Time: 1 week

## 2022-05-10 NOTE — PROGRESS NOTE ADULT - SUBJECTIVE AND OBJECTIVE BOX
Patient seen and examined at bedside, no acute overnight events. No acute complaints. Patient endorses good fetal movement. Patient is ambulating and tolerating regular diet. Denies CP, SOB, N/V, fevers, chills, or any other concerns.    Vital Signs Last 24 Hours  T(C): 36.6 (05-10-22 @ 05:57), Max: 37.2 (05-09-22 @ 15:54)  HR: 66 (05-10-22 @ 05:57) (56 - 90)  BP: 103/50 (05-10-22 @ 05:57) (100/60 - 118/65)  RR: 18 (05-10-22 @ 05:57) (17 - 18)  SpO2: 97% (05-10-22 @ 05:57) (92% - 98%)    Physical Exam:  General: NAD  CV: RR  Lungs: breathing comfortably on RA  Abdomen: soft, gravid, non-tender  Ext: no pain or swelling    NST: baseline 140, mod thor, +accels, no decels  [place]: q2 min    Labs:             11.0<L>  10.38 )-----------( 237      ( 05-09 @ 21:56 )             33.4<L>    MEDICATIONS  (STANDING):  acetaminophen   IVPB .. 1000 milliGRAM(s) IV Intermittent once  ferrous    sulfate 325 milliGRAM(s) Oral daily  folic acid 1 milliGRAM(s) Oral daily  prenatal multivitamin 1 Tablet(s) Oral daily

## 2022-05-10 NOTE — DISCHARGE NOTE ANTEPARTUM - PLAN OF CARE
Patient admitted for  labor, given betamethasone for FLM.  Patient discharged in stable condition, labor arrested at 1cm.  PTL precautions given, f/u in office in 1 week.
(3) walks occasionally

## 2022-05-10 NOTE — PROGRESS NOTE ADULT - ATTENDING COMMENTS
OB attending    Patient seen at bedside, c/o irreg ctx throughout the day. SVE unchanged. 1/long-3  Patient is stable for discharge after 2nd dose of beta.  Reviewed threatened PTL and  contractions   Patient to f/u in office Monday as scheduled.   nst reviewed from this AM, CTX irreg.       Traci Landa MD

## 2022-05-10 NOTE — DISCHARGE NOTE ANTEPARTUM - CARE PLAN
1 Principal Discharge DX:	 contractions  Assessment and plan of treatment:	Patient admitted for  labor, given betamethasone for FLM.  Patient discharged in stable condition, labor arrested at 1cm.  PTL precautions given, f/u in office in 1 week.

## 2022-05-10 NOTE — PROGRESS NOTE ADULT - ASSESSMENT
30 yo  at 33.4 wks GA presenting w/ PTL vs. PTC w/ consistent contraction pattern admitted for r/o PTL and steroids.  Patient has since stopped feeling contractions with no further cervical dilation.  Maternal and fetal status reassuring    #PTL  -BMZ for fetal lung maturity (-)  -IV hydration    #Fetal Well Being  -NST BID  -BMZ for lung maturity  -NICU consult as needed  -BPP PRN    #Maternal Well Being  -regular diet  -SCDs for DVT ppx  -f/u GBS ()    Dispo: d/c following second dose of beta    MGreenman PGY3

## 2022-05-10 NOTE — DISCHARGE NOTE ANTEPARTUM - NS MD DC FALL RISK RISK
For information on Fall & Injury Prevention, visit: [email or web address] OR  [email or web address] OR  https://www.cdc.gov/steadi/patient.html

## 2022-05-11 LAB
GROUP B BETA STREP DNA (PCR): SIGNIFICANT CHANGE UP
GROUP B BETA STREP INTERPRETATION: SIGNIFICANT CHANGE UP
SOURCE GROUP B STREP: SIGNIFICANT CHANGE UP

## 2022-05-13 ENCOUNTER — OUTPATIENT (OUTPATIENT)
Dept: OUTPATIENT SERVICES | Facility: HOSPITAL | Age: 32
LOS: 1 days | End: 2022-05-13
Payer: COMMERCIAL

## 2022-05-13 VITALS
OXYGEN SATURATION: 100 % | SYSTOLIC BLOOD PRESSURE: 119 MMHG | TEMPERATURE: 98 F | DIASTOLIC BLOOD PRESSURE: 69 MMHG | RESPIRATION RATE: 18 BRPM | HEART RATE: 82 BPM

## 2022-05-13 VITALS
OXYGEN SATURATION: 96 % | TEMPERATURE: 98 F | SYSTOLIC BLOOD PRESSURE: 122 MMHG | HEART RATE: 72 BPM | DIASTOLIC BLOOD PRESSURE: 60 MMHG

## 2022-05-13 DIAGNOSIS — Z3A.00 WEEKS OF GESTATION OF PREGNANCY NOT SPECIFIED: ICD-10-CM

## 2022-05-13 DIAGNOSIS — O26.899 OTHER SPECIFIED PREGNANCY RELATED CONDITIONS, UNSPECIFIED TRIMESTER: ICD-10-CM

## 2022-05-13 LAB

## 2022-05-13 PROCEDURE — G0463: CPT

## 2022-05-13 PROCEDURE — 81003 URINALYSIS AUTO W/O SCOPE: CPT

## 2022-05-13 PROCEDURE — 87086 URINE CULTURE/COLONY COUNT: CPT

## 2022-05-13 PROCEDURE — 59025 FETAL NON-STRESS TEST: CPT

## 2022-05-13 RX ORDER — ACETAMINOPHEN 500 MG
975 TABLET ORAL ONCE
Refills: 0 | Status: COMPLETED | OUTPATIENT
Start: 2022-05-13 | End: 2022-05-13

## 2022-05-13 RX ADMIN — Medication 975 MILLIGRAM(S): at 22:23

## 2022-05-13 NOTE — OB RN TRIAGE NOTE - FALL HARM RISK - UNIVERSAL INTERVENTIONS
Bed in lowest position, wheels locked, appropriate side rails in place/Call bell, personal items and telephone in reach/Instruct patient to call for assistance before getting out of bed or chair/Non-slip footwear when patient is out of bed/[place] to call system/Physically safe environment - no spills, clutter or unnecessary equipment/Purposeful Proactive Rounding/Room/bathroom lighting operational, light cord in reach

## 2022-05-13 NOTE — OB RN TRIAGE NOTE - NSICDXPASTMEDICALHX_GEN_ALL_CORE_FT
PAST MEDICAL HISTORY:  History of tonsillectomy      (normal spontaneous vaginal delivery) ,     Strep Throat

## 2022-05-13 NOTE — OB PROVIDER TRIAGE NOTE - NSHPPHYSICALEXAM_GEN_ALL_CORE
VS  T(C): 36.6 (05-13-22 @ 21:23)  HR: 73 (05-13-22 @ 23:12)  BP: 119/69 (05-13-22 @ 21:23)  RR: 18 (05-13-22 @ 21:18)  SpO2: 96% (05-13-22 @ 23:12)    Gen: A&Ox3 NAD well appearing  CV: RRR  Pulm: Respirations even, unlabored  Abd: Soft, gravid, nontender  Ext: Full ROM     SVE: 1/60/-3   EFM: Reactive NST, baseline 135 +accels -decels  [place]: Irritability on toco   TAUS: VTX

## 2022-05-13 NOTE — OB PROVIDER TRIAGE NOTE - HISTORY OF PRESENT ILLNESS
OB PA Triage Note    31yoF  @34 weeks (JAVED 22) presents for r/o PTL. Pt recently seen in triage x 3 days ago for contractions was found to be 1/60/-3 on exam. Pt states contractions have become more uncomfortable and are occurring about q10 mins. Pt denies any LOF or VB. Pt endorses FM. Pt denies any other concern.     PNC: uncomplicated. GBS unknown. EFW 2300  OBHx: ()  male 8 lbs 10oz uncomplicated               ()  male 7 lbs 8oz uncomplicated   GYNHx: denies h/o fibroids, ovarian cysts, abnl pap, STDs  PMH: denies h/o HTN, DM, asthma, thyroid disorders, bleeding disorders, h/o blood transfusions    PSH: () open appendectomy          () knee surgery            () tonsillectomy  Medications: PNV  Allergies: NKDA  Social/Psych: denies alcohol/tobacco/drug use in pregnancy, denies h/o anxiety/depression     Vital Signs Last 24 Hrs  T(C): 36.6 (13 May 2022 21:23), Max: 36.6 (13 May 2022 21:18)  T(F): 97.88 (13 May 2022 21:23), Max: 97.9 (13 May 2022 21:18)  HR: 75 (13 May 2022 22:02) (73 - 97)  BP: 119/69 (13 May 2022 21:23) (119/69 - 119/69)  RR: 18 (13 May 2022 21:18) (18 - 18)  SpO2: 97% (13 May 2022 22:02) (96% - 100%)    Gen: NAD  CV: RRR  Lungs: CTA b/l   Abd: gravid, mildly tender to palpation   Ext: BLE non-edematous, no calf tenderness b/l     – VE: 1/60/-3  – FHT: baseline 140, mod variability, +accels, -decels  – [place]: irregular   – EFW: 2300  – [place]: vertex   OB PA Triage Note    31yoF  @34 weeks (JAVED 22) presents for r/o PTL. Pt recently seen in triage x 3 days ago for contractions was found to be 1/60/-3 on exam. Pt states contractions have become more uncomfortable and are occurring about q10 mins. Pt denies any LOF or VB. Pt endorses FM. Pt denies any other concern.     PNC: uncomplicated. GBS unknown. EFW 2300  OBHx: ()  male 8 lbs 10oz uncomplicated               ()  male 7 lbs 8oz uncomplicated   GYNHx: denies h/o fibroids, ovarian cysts, abnl pap, STDs  PMH: denies h/o HTN, DM, asthma, thyroid disorders, bleeding disorders, h/o blood transfusions    PSH: () open appendectomy          () knee surgery            () tonsillectomy  Medications: PNV  Allergies: NKDA  Social/Psych: denies alcohol/tobacco/drug use in pregnancy, denies h/o anxiety/depression     Vital Signs Last 24 Hrs  T(C): 36.6 (13 May 2022 21:23), Max: 36.6 (13 May 2022 21:18)  T(F): 97.88 (13 May 2022 21:23), Max: 97.9 (13 May 2022 21:18)  HR: 75 (13 May 2022 22:02) (73 - 97)  BP: 119/69 (13 May 2022 21:23) (119/69 - 119/69)  RR: 18 (13 May 2022 21:18) (18 - 18)  SpO2: 97% (13 May 2022 22:02) (96% - 100%)    Gen: NAD  CV: RRR  Lungs: CTA b/l   Abd: gravid, mildly tender to palpation   Ext: BLE non-edematous, no calf tenderness b/l     – VE: 1/60/-3  – FHT: baseline 140, mod variability, +accels, -decels  – [place]: irregular   – EFW: 2300g  – [place]: vertex

## 2022-05-13 NOTE — OB PROVIDER TRIAGE NOTE - NSHPLABSRESULTS_GEN_ALL_CORE
Urinalysis Basic - ( 13 May 2022 22:16 )    Color: Light Yellow / Appearance: Clear / S.008 / pH: x  Gluc: x / Ketone: Negative  / Bili: Negative / Urobili: Negative   Blood: x / Protein: Negative / Nitrite: Negative   Leuk Esterase: Negative / RBC: x / WBC x   Sq Epi: x / Non Sq Epi: x / Bacteria: x

## 2022-05-13 NOTE — OB PROVIDER TRIAGE NOTE - NSOBPROVIDERNOTE_OBGYN_ALL_OB_FT
A/P: 31yoF  @34 weeks (JAVED 22) presents for r/o PTL. NST reactive.   - Re-pavel @11pm  - PO hydrate   - UA/UC ordered   - NST   - D/w Dr. Ladarius Gaffney, PA-C A/P: 31yoF  @34 weeks (JAVED 22) presents for r/o PTL. NST reactive.   - Re-eval @11pm  - PO hydrate   - UA/UC ordered   - NST   - D/w Dr. Ladarius Gaffney, PA-C    Addendum  at 1120PM:   Patient seen and reevaluated at bedside.   Patient reports persistent ctx q10 minutes, unchanged from prior.   SVE unchanged.   UA wnl.      - Stable for discharge home   - To follow-up in office on Monday at scheduled appointment    - Return precautions reviewed     dw Dr. Ladarius Sewell, PGY-3

## 2022-05-14 LAB
CULTURE RESULTS: NO GROWTH — SIGNIFICANT CHANGE UP
SPECIMEN SOURCE: SIGNIFICANT CHANGE UP

## 2022-06-15 ENCOUNTER — TRANSCRIPTION ENCOUNTER (OUTPATIENT)
Age: 32
End: 2022-06-15

## 2022-06-15 ENCOUNTER — INPATIENT (INPATIENT)
Facility: HOSPITAL | Age: 32
LOS: 0 days | Discharge: HOME | End: 2022-06-16
Attending: STUDENT IN AN ORGANIZED HEALTH CARE EDUCATION/TRAINING PROGRAM | Admitting: STUDENT IN AN ORGANIZED HEALTH CARE EDUCATION/TRAINING PROGRAM
Payer: COMMERCIAL

## 2022-06-15 VITALS
TEMPERATURE: 98 F | RESPIRATION RATE: 18 BRPM | HEART RATE: 108 BPM | SYSTOLIC BLOOD PRESSURE: 117 MMHG | DIASTOLIC BLOOD PRESSURE: 74 MMHG

## 2022-06-15 DIAGNOSIS — Z3A.00 WEEKS OF GESTATION OF PREGNANCY NOT SPECIFIED: ICD-10-CM

## 2022-06-15 DIAGNOSIS — O26.899 OTHER SPECIFIED PREGNANCY RELATED CONDITIONS, UNSPECIFIED TRIMESTER: ICD-10-CM

## 2022-06-15 DIAGNOSIS — Z90.89 ACQUIRED ABSENCE OF OTHER ORGANS: Chronic | ICD-10-CM

## 2022-06-15 DIAGNOSIS — Z34.80 ENCOUNTER FOR SUPERVISION OF OTHER NORMAL PREGNANCY, UNSPECIFIED TRIMESTER: ICD-10-CM

## 2022-06-15 LAB
BASOPHILS # BLD AUTO: 0.04 K/UL — SIGNIFICANT CHANGE UP (ref 0–0.2)
BASOPHILS NFR BLD AUTO: 0.4 % — SIGNIFICANT CHANGE UP (ref 0–2)
BLD GP AB SCN SERPL QL: NEGATIVE — SIGNIFICANT CHANGE UP
EOSINOPHIL # BLD AUTO: 0.08 K/UL — SIGNIFICANT CHANGE UP (ref 0–0.5)
EOSINOPHIL NFR BLD AUTO: 0.7 % — SIGNIFICANT CHANGE UP (ref 0–6)
HCT VFR BLD CALC: 33.8 % — LOW (ref 34.5–45)
HGB BLD-MCNC: 10.9 G/DL — LOW (ref 11.5–15.5)
IMM GRANULOCYTES NFR BLD AUTO: 0.9 % — SIGNIFICANT CHANGE UP (ref 0–1.5)
LYMPHOCYTES # BLD AUTO: 2.43 K/UL — SIGNIFICANT CHANGE UP (ref 1–3.3)
LYMPHOCYTES # BLD AUTO: 21.7 % — SIGNIFICANT CHANGE UP (ref 13–44)
MCHC RBC-ENTMCNC: 28.8 PG — SIGNIFICANT CHANGE UP (ref 27–34)
MCHC RBC-ENTMCNC: 32.2 GM/DL — SIGNIFICANT CHANGE UP (ref 32–36)
MCV RBC AUTO: 89.4 FL — SIGNIFICANT CHANGE UP (ref 80–100)
MONOCYTES # BLD AUTO: 0.58 K/UL — SIGNIFICANT CHANGE UP (ref 0–0.9)
MONOCYTES NFR BLD AUTO: 5.2 % — SIGNIFICANT CHANGE UP (ref 2–14)
NEUTROPHILS # BLD AUTO: 7.95 K/UL — HIGH (ref 1.8–7.4)
NEUTROPHILS NFR BLD AUTO: 71.1 % — SIGNIFICANT CHANGE UP (ref 43–77)
NRBC # BLD: 0 /100 WBCS — SIGNIFICANT CHANGE UP (ref 0–0)
PLATELET # BLD AUTO: 248 K/UL — SIGNIFICANT CHANGE UP (ref 150–400)
RBC # BLD: 3.78 M/UL — LOW (ref 3.8–5.2)
RBC # FLD: 12.8 % — SIGNIFICANT CHANGE UP (ref 10.3–14.5)
RH IG SCN BLD-IMP: POSITIVE — SIGNIFICANT CHANGE UP
SARS-COV-2 RNA SPEC QL NAA+PROBE: SIGNIFICANT CHANGE UP
T PALLIDUM AB TITR SER: NEGATIVE — SIGNIFICANT CHANGE UP
WBC # BLD: 11.18 K/UL — HIGH (ref 3.8–10.5)
WBC # FLD AUTO: 11.18 K/UL — HIGH (ref 3.8–10.5)

## 2022-06-15 RX ORDER — LANOLIN
1 OINTMENT (GRAM) TOPICAL EVERY 6 HOURS
Refills: 0 | Status: DISCONTINUED | OUTPATIENT
Start: 2022-06-15 | End: 2022-06-16

## 2022-06-15 RX ORDER — DIBUCAINE 1 %
1 OINTMENT (GRAM) RECTAL EVERY 6 HOURS
Refills: 0 | Status: DISCONTINUED | OUTPATIENT
Start: 2022-06-15 | End: 2022-06-16

## 2022-06-15 RX ORDER — BENZOCAINE 10 %
1 GEL (GRAM) MUCOUS MEMBRANE EVERY 6 HOURS
Refills: 0 | Status: DISCONTINUED | OUTPATIENT
Start: 2022-06-15 | End: 2022-06-16

## 2022-06-15 RX ORDER — SODIUM CHLORIDE 9 MG/ML
3 INJECTION INTRAMUSCULAR; INTRAVENOUS; SUBCUTANEOUS EVERY 8 HOURS
Refills: 0 | Status: DISCONTINUED | OUTPATIENT
Start: 2022-06-15 | End: 2022-06-16

## 2022-06-15 RX ORDER — MAGNESIUM HYDROXIDE 400 MG/1
30 TABLET, CHEWABLE ORAL
Refills: 0 | Status: DISCONTINUED | OUTPATIENT
Start: 2022-06-15 | End: 2022-06-16

## 2022-06-15 RX ORDER — OXYTOCIN 10 UNIT/ML
333.33 VIAL (ML) INJECTION
Qty: 20 | Refills: 0 | Status: COMPLETED | OUTPATIENT
Start: 2022-06-15 | End: 2022-06-15

## 2022-06-15 RX ORDER — HYDROCORTISONE 1 %
1 OINTMENT (GRAM) TOPICAL EVERY 6 HOURS
Refills: 0 | Status: DISCONTINUED | OUTPATIENT
Start: 2022-06-15 | End: 2022-06-16

## 2022-06-15 RX ORDER — OXYTOCIN 10 UNIT/ML
4 VIAL (ML) INJECTION
Qty: 30 | Refills: 0 | Status: DISCONTINUED | OUTPATIENT
Start: 2022-06-15 | End: 2022-06-16

## 2022-06-15 RX ORDER — AER TRAVELER 0.5 G/1
1 SOLUTION RECTAL; TOPICAL EVERY 4 HOURS
Refills: 0 | Status: DISCONTINUED | OUTPATIENT
Start: 2022-06-15 | End: 2022-06-16

## 2022-06-15 RX ORDER — OXYCODONE HYDROCHLORIDE 5 MG/1
5 TABLET ORAL ONCE
Refills: 0 | Status: DISCONTINUED | OUTPATIENT
Start: 2022-06-15 | End: 2022-06-16

## 2022-06-15 RX ORDER — DIPHENHYDRAMINE HCL 50 MG
25 CAPSULE ORAL EVERY 6 HOURS
Refills: 0 | Status: DISCONTINUED | OUTPATIENT
Start: 2022-06-15 | End: 2022-06-16

## 2022-06-15 RX ORDER — TETANUS TOXOID, REDUCED DIPHTHERIA TOXOID AND ACELLULAR PERTUSSIS VACCINE, ADSORBED 5; 2.5; 8; 8; 2.5 [IU]/.5ML; [IU]/.5ML; UG/.5ML; UG/.5ML; UG/.5ML
0.5 SUSPENSION INTRAMUSCULAR ONCE
Refills: 0 | Status: DISCONTINUED | OUTPATIENT
Start: 2022-06-15 | End: 2022-06-16

## 2022-06-15 RX ORDER — OXYCODONE HYDROCHLORIDE 5 MG/1
5 TABLET ORAL
Refills: 0 | Status: DISCONTINUED | OUTPATIENT
Start: 2022-06-15 | End: 2022-06-16

## 2022-06-15 RX ORDER — PRAMOXINE HYDROCHLORIDE 150 MG/15G
1 AEROSOL, FOAM RECTAL EVERY 4 HOURS
Refills: 0 | Status: DISCONTINUED | OUTPATIENT
Start: 2022-06-15 | End: 2022-06-16

## 2022-06-15 RX ORDER — ACETAMINOPHEN 500 MG
975 TABLET ORAL
Refills: 0 | Status: DISCONTINUED | OUTPATIENT
Start: 2022-06-15 | End: 2022-06-16

## 2022-06-15 RX ORDER — IBUPROFEN 200 MG
600 TABLET ORAL EVERY 6 HOURS
Refills: 0 | Status: COMPLETED | OUTPATIENT
Start: 2022-06-15 | End: 2023-05-14

## 2022-06-15 RX ORDER — KETOROLAC TROMETHAMINE 30 MG/ML
30 SYRINGE (ML) INJECTION ONCE
Refills: 0 | Status: DISCONTINUED | OUTPATIENT
Start: 2022-06-15 | End: 2022-06-15

## 2022-06-15 RX ORDER — SODIUM CHLORIDE 9 MG/ML
1000 INJECTION, SOLUTION INTRAVENOUS
Refills: 0 | Status: DISCONTINUED | OUTPATIENT
Start: 2022-06-15 | End: 2022-06-15

## 2022-06-15 RX ORDER — CITRIC ACID/SODIUM CITRATE 300-500 MG
15 SOLUTION, ORAL ORAL EVERY 6 HOURS
Refills: 0 | Status: DISCONTINUED | OUTPATIENT
Start: 2022-06-15 | End: 2022-06-15

## 2022-06-15 RX ORDER — OXYTOCIN 10 UNIT/ML
333.33 VIAL (ML) INJECTION
Qty: 20 | Refills: 0 | Status: DISCONTINUED | OUTPATIENT
Start: 2022-06-15 | End: 2022-06-16

## 2022-06-15 RX ORDER — IBUPROFEN 200 MG
1 TABLET ORAL
Qty: 0 | Refills: 0 | DISCHARGE
Start: 2022-06-15

## 2022-06-15 RX ORDER — SIMETHICONE 80 MG/1
80 TABLET, CHEWABLE ORAL EVERY 4 HOURS
Refills: 0 | Status: DISCONTINUED | OUTPATIENT
Start: 2022-06-15 | End: 2022-06-16

## 2022-06-15 RX ORDER — ACETAMINOPHEN 500 MG
2 TABLET ORAL
Qty: 0 | Refills: 0 | DISCHARGE
Start: 2022-06-15

## 2022-06-15 RX ADMIN — Medication 30 MILLIGRAM(S): at 16:55

## 2022-06-15 RX ADMIN — SODIUM CHLORIDE 3 MILLILITER(S): 9 INJECTION INTRAMUSCULAR; INTRAVENOUS; SUBCUTANEOUS at 22:00

## 2022-06-15 RX ADMIN — Medication 4 MILLIUNIT(S)/MIN: at 12:30

## 2022-06-15 RX ADMIN — Medication 1000 MILLIUNIT(S)/MIN: at 16:56

## 2022-06-15 RX ADMIN — Medication 975 MILLIGRAM(S): at 21:44

## 2022-06-15 RX ADMIN — Medication 30 MILLIGRAM(S): at 16:07

## 2022-06-15 RX ADMIN — SODIUM CHLORIDE 125 MILLILITER(S): 9 INJECTION, SOLUTION INTRAVENOUS at 12:29

## 2022-06-15 RX ADMIN — Medication 975 MILLIGRAM(S): at 21:14

## 2022-06-15 RX ADMIN — SODIUM CHLORIDE 125 MILLILITER(S): 9 INJECTION, SOLUTION INTRAVENOUS at 12:30

## 2022-06-15 NOTE — DISCHARGE NOTE OB - PATIENT PORTAL LINK FT
You can access the FollowMyHealth Patient Portal offered by [facility] by registering at the following website: [email or web address]. By joining [employer]’s FollowMyHealth portal, you will also be able to view your health information using other applications (apps) compatible with our system.

## 2022-06-15 NOTE — OB PROVIDER H&P - NSICDXPASTSURGICALHX_GEN_ALL_CORE_FT
PAST SURGICAL HISTORY:  History of Appendectomy 2003    History of tonsillectomy 2010    S/P Arthroscopy of Knee 2006

## 2022-06-15 NOTE — OB PROVIDER DELIVERY SUMMARY - NSSELHIDDEN_OBGYN_ALL_OB_FT
[NS_DeliveryAttending2_OBGYN_ALL_OB_FT:[id number]==],[NS_DeliveryAttending1_OBGYN_ALL_OB_FT:[id number]==]

## 2022-06-15 NOTE — DISCHARGE NOTE OB - CARE PROVIDERS DIRECT ADDRESSES
[email or web address]

## 2022-06-15 NOTE — OB PROVIDER H&P - HISTORY OF PRESENT ILLNESS
30 yo  at 38w5d who presents for SROM at 9:30AM today. She states that she felt a gush of clear fluids from her vaginal at 9:30AM and has continuous leakage of clear fluids since then. She also endorses contractions q5-10 minutes since 10AM. Denies VB. +FM. Denies headaches, vision changes, fever/chills, N/V, SOB, chest pain, abd pain, and dysuria. Of note, has h/o precipitous deliveries.     PNC: uncomplicated  JAVED: 22, eIOL 22  EFW: 6#13 at 37wk  GBS neg     OBHx: :  in 2018, uncomplicated, FT, 8#10  G2:  in 2020, uncomplicated, FT, 7#8  G3: current pregnancy  Gyn Hx: Denies fibroid, polyps, ovarian cysts, endometriosis, hx of STDs, hx of abnormal paps   PMHx: Denies  Surg: Lap appendectomy in , L knee surgery in , Tonsillectomy in , h/o wisdom teeth removal  Meds: PNV  All: NKDA  Social: Denies x3, feels safe at home, denies hx anxiety and depression       32 yo  at 38w5d who presents for SROM at 9:30AM today and contractions. States that she felt a gush of clear fluids at 9:30AM and has had continuous leakage of clear fluids since then. +CTX q5-10 minutes since 10AM. Denies VB. +FM. Of note, has h/o precipitous deliveries.     PNC: uncomplicated, measuring, 6#13 at 37wk  GBS neg     OBHx:   G1:  in , uncomplicated, FT, 8#10  G2:  in , uncomplicated, FT, 7#8  G3: current pregnancy  Gyn Hx: Denies fibroid, polyps, ovarian cysts, endometriosis, hx of STDs, hx of abnormal paps   PMHx: Denies  Surg: LSC appendectomy in , L knee surgery in , Tonsillectomy in , h/o wisdom teeth removal  Meds: PNV  All: NKDA  Social: Denies x3, feels safe at home, denies hx anxiety and depression

## 2022-06-15 NOTE — OB PROVIDER H&P - ASSESSMENT
Assessment:  32 yo  at 38w5d admitted to L&D for labor and SROM at term. EFW 3090. GBS neg. JAVED 22 with eIOL on 22.     Plan:  - Admit to L&D  - Routine admission labs  - Continuous EFM/[place]  - NPO, IVF, Bicitra  - IOL with pitocin  - GBS neg  - Anesthesia c/s PRN  - Anticipate     Pt seen with Dr. Willis PGY4 and Rasta Woodward MS4  Plan discussed with Dr. Mello   Assessment:  30 yo  at 38w5d admitted to L&D for labor and SROM at term.   Plan:  - Admit to L&D  - Routine admission labs  - Continuous EFM/[place]  - NPO, IVF, Bicitra  - Agumentation pitocin  - GBS neg  - For epidural  - Anticipate     Pt seen with Dr. Willis PGY4   Plan discussed with Dr. Mello  written by Rasta Woodward MS4     Assessment:  32 yo  at 38w5d admitted to L&D for labor and SROM at term.   Plan:  - Admit to L&D  - Routine admission labs  - Continuous EFM/[place]  - NPO, IVF, Bicitra  - Agumentation pitocin  - GBS neg  - For epidural  - Anticipate     Pt seen with Dr. Willis PGY4   Plan discussed with Dr. Mello  written by Rasta Woodward MS4      OB attending  P2 admitted with ROM - early labor  fetal status reassuring  cont toco and efm  consents obtained  epidural prn  pit augmentation  VSS    Pia Mello MD

## 2022-06-15 NOTE — OB PROVIDER H&P - NSHPPHYSICALEXAM_GEN_ALL_CORE
Vital Signs Last 24 Hrs  T(C): --  T(F): --  HR: 74 (15 Osorio 2022 11:24) (70 - 108)  BP: 117/74 (15 Osorio 2022 10:53) (117/74 - 117/74)  BP(mean): --  RR: --  SpO2: 99% (15 Osorio 2022 11:24) (97% - 100%)    Gen: well-appearing, no acute distress  Resp: CTAB  CV: RRR, S1S2  Abd: soft, gravid, non-tender, contractions felt, uterus relaxed between contractions  Ext: no edema in b/l lower extremities  SVE: 3/80/-2, grossly ruptured    FHT: 150/mod/+accel/-decel  [place]: Contractions q5min  BSS: vertex Vital Signs Last 24 Hrs  T(C): --  T(F): --  HR: 74 (15 Osorio 2022 11:24) (70 - 108)  BP: 117/74 (15 Osorio 2022 10:53) (117/74 - 117/74)  BP(mean): --  RR: --  SpO2: 99% (15 Osorio 2022 11:24) (97% - 100%)    Gen: well-appearing, no acute distress  Resp: CTAB  CV: RRR, S1S2  Abd: soft, gravid, non-tender, contractions felt, uterus relaxed between contractions  Ext: no edema in b/l lower extremities  SVE: 3/80/-2, grossly ruptured, bulging forebag (exam by Dr. Mello)    FHT: 150/mod/+accel/-decel  [place]: Contractions q5min  BSS: vertex

## 2022-06-15 NOTE — DISCHARGE NOTE OB - NS MD DC FALL RISK RISK
For information on Fall & Injury Prevention, visit: [email or web address] OR  [email or web address] OR  https://www.cdc.gov/steadi/patient.html

## 2022-06-15 NOTE — OB RN DELIVERY SUMMARY - NS_SEPSISRSKCALC_OBGYN_ALL_OB_FT
EOS calculated successfully. EOS Risk Factor: 0.5/1000 live births ([facility] national incidence); GA=38w5d; Temp=98.42; ROM=4.883; GBS='Negative'; Antibiotics='No antibiotics or any antibiotics < 2 hrs prior to birth'

## 2022-06-15 NOTE — DISCHARGE NOTE OB - MEDICATION SUMMARY - MEDICATIONS TO TAKE
I will START or STAY ON the medications listed below when I get home from the hospital:    acetaminophen 325 mg oral tablet  -- 2 tab(s) by mouth every 6 hours  -- Indication: For     ibuprofen 600 mg oral tablet  -- 1 tab(s) by mouth every 6 hours  -- Indication: For     Prenatal Multivitamins with Folic Acid 1 mg oral tablet  -- 1 tab(s) by mouth once a day  -- Indication: For

## 2022-06-15 NOTE — OB RN PATIENT PROFILE - FALL HARM RISK - UNIVERSAL INTERVENTIONS
Bed in lowest position, wheels locked, appropriate side rails in place/Call bell, personal items and telephone in reach/Instruct patient to call for assistance before getting out of bed or chair/Non-slip footwear when patient is out of bed/[place] to call system/Physically safe environment - no spills, clutter or unnecessary equipment/Purposeful Proactive Rounding/Room/bathroom lighting operational, light cord in reach

## 2022-06-15 NOTE — DISCHARGE NOTE OB - HOSPITAL COURSE
Pt admitted in early labor   -   Normal post partum course  VSS and afebrile  perineum healing well, min lochia  d/c home ppd#1  instructions given  f/u 6 weeks

## 2022-06-15 NOTE — OB RN DELIVERY SUMMARY - NSSELHIDDEN_OBGYN_ALL_OB_FT
[NS_DeliveryAttending2_OBGYN_ALL_OB_FT:[id number]==],[NS_DeliveryAttending1_OBGYN_ALL_OB_FT:[id number]==],[NS_DeliveryRN_OBGYN_ALL_OB_FT:[id number]]

## 2022-06-15 NOTE — DISCHARGE NOTE OB - CARE PROVIDER_API CALL
Pia Mello)  Obstetrics and Gynecology  [address], [address]  [place] [id number]  Phone: [phone]  Fax: [phone]  Follow Up Time:

## 2022-06-15 NOTE — OB PROVIDER H&P - NS_AMNIOTICAMT_OBGYN_ALL_OB
Problem: Pressure Injury - Risk of 
Goal: *Prevention of pressure injury Document Juan Scale and appropriate interventions in the flowsheet. Outcome: Progressing Towards Goal 
Pressure Injury Interventions: 
Sensory Interventions: Assess changes in LOC, Keep linens dry and wrinkle-free, Minimize linen layers, Maintain/enhance activity level Moisture Interventions: Absorbent underpads, Limit adult briefs, Minimize layers, Moisture barrier Activity Interventions: Chair cushion, Increase time out of bed, Pressure redistribution bed/mattress(bed type) Mobility Interventions: HOB 30 degrees or less, PT/OT evaluation Nutrition Interventions: Document food/fluid/supplement intake Friction and Shear Interventions: Lift sheet, HOB 30 degrees or less Moderate

## 2022-06-15 NOTE — OB PROVIDER DELIVERY SUMMARY - NSPROVIDERDELIVERYNOTE_OBGYN_ALL_OB_FT
Bed Broken  Left anterior shoulder --> right posterior shoulder  spontaneous cry and good tone on delivery  viable baby boy  2nd degree laceration - Betadine used  repaired in layers  uterus well contracted  cervix and rectum intact  counts correct x2

## 2022-06-16 VITALS
SYSTOLIC BLOOD PRESSURE: 100 MMHG | HEART RATE: 60 BPM | OXYGEN SATURATION: 97 % | DIASTOLIC BLOOD PRESSURE: 62 MMHG | TEMPERATURE: 98 F | RESPIRATION RATE: 18 BRPM

## 2022-06-16 LAB
COVID-19 SPIKE DOMAIN AB INTERP: POSITIVE
COVID-19 SPIKE DOMAIN ANTIBODY RESULT: >250 U/ML — HIGH
SARS-COV-2 IGG+IGM SERPL QL IA: >250 U/ML — HIGH
SARS-COV-2 IGG+IGM SERPL QL IA: POSITIVE

## 2022-06-16 PROCEDURE — 59025 FETAL NON-STRESS TEST: CPT

## 2022-06-16 PROCEDURE — 86901 BLOOD TYPING SEROLOGIC RH(D): CPT

## 2022-06-16 PROCEDURE — 87635 SARS-COV-2 COVID-19 AMP PRB: CPT

## 2022-06-16 PROCEDURE — 59050 FETAL MONITOR W/REPORT: CPT

## 2022-06-16 PROCEDURE — G0463: CPT

## 2022-06-16 PROCEDURE — 86780 TREPONEMA PALLIDUM: CPT

## 2022-06-16 PROCEDURE — 86900 BLOOD TYPING SEROLOGIC ABO: CPT

## 2022-06-16 PROCEDURE — 36415 COLL VENOUS BLD VENIPUNCTURE: CPT

## 2022-06-16 PROCEDURE — 85025 COMPLETE CBC W/AUTO DIFF WBC: CPT

## 2022-06-16 PROCEDURE — 86769 SARS-COV-2 COVID-19 ANTIBODY: CPT

## 2022-06-16 PROCEDURE — 86850 RBC ANTIBODY SCREEN: CPT

## 2022-06-16 RX ORDER — IBUPROFEN 200 MG
600 TABLET ORAL EVERY 6 HOURS
Refills: 0 | Status: DISCONTINUED | OUTPATIENT
Start: 2022-06-16 | End: 2022-06-16

## 2022-06-16 RX ORDER — IBUPROFEN 200 MG
1 TABLET ORAL
Qty: 0 | Refills: 0 | DISCHARGE
Start: 2022-06-16

## 2022-06-16 RX ORDER — ACETAMINOPHEN 500 MG
2 TABLET ORAL
Qty: 0 | Refills: 0 | DISCHARGE
Start: 2022-06-16

## 2022-06-16 RX ADMIN — Medication 600 MILLIGRAM(S): at 06:12

## 2022-06-16 RX ADMIN — Medication 975 MILLIGRAM(S): at 15:46

## 2022-06-16 RX ADMIN — Medication 600 MILLIGRAM(S): at 05:42

## 2022-06-16 RX ADMIN — Medication 600 MILLIGRAM(S): at 12:45

## 2022-06-16 RX ADMIN — Medication 600 MILLIGRAM(S): at 01:09

## 2022-06-16 RX ADMIN — Medication 600 MILLIGRAM(S): at 00:39

## 2022-06-16 RX ADMIN — Medication 1 TABLET(S): at 12:15

## 2022-06-16 RX ADMIN — Medication 975 MILLIGRAM(S): at 09:34

## 2022-06-16 RX ADMIN — Medication 600 MILLIGRAM(S): at 12:15

## 2022-06-16 RX ADMIN — Medication 975 MILLIGRAM(S): at 15:16

## 2022-06-16 RX ADMIN — Medication 975 MILLIGRAM(S): at 09:04

## 2022-06-16 RX ADMIN — SODIUM CHLORIDE 3 MILLILITER(S): 9 INJECTION INTRAMUSCULAR; INTRAVENOUS; SUBCUTANEOUS at 06:44

## 2022-06-16 NOTE — PROGRESS NOTE ADULT - ATTENDING COMMENTS
Pt seen and note reviewed\  Pt feels well  VSS AF  Fundus firm NTU-2  Ext: trace edema  A/P: PPD s/p   stable for discharge  Christine Roberts MD

## 2022-06-16 NOTE — PROGRESS NOTE ADULT - SUBJECTIVE AND OBJECTIVE BOX
Postpartum Note- PPD#1    Prenatal Labs  Blood type: O Positive  RPR: Negative    S: Patient w/o complaints, pain is controlled.    Pt is resting comfortably in bed. OOB. Tolerating PO. Voiding without issues. No acute complaints at this time.    O:  Vital Signs Last 24 Hrs  T(C): 36.9 (2022 06:06), Max: 36.9 (15 Osorio 2022 13:45)  T(F): 98.4 (2022 06:06), Max: 98.42 (15 Osorio 2022 13:45)  HR: 51 (2022 06:06) (48 - 108)  BP: 102/65 (2022 06:06) (102/65 - 137/65)  RR: 18 (2022 06:06) (18 - 18)  SpO2: 99% (2022 06:06) (87% - 100%)     Gen: NAD, resting comfortably  Abdomen: Soft, nontender, non-distended, fundus firm.  Lungs: CTA b/l  Cardio: RRR, S1S2  Ext: Negative calf tenderness    LABS:  Hemoglobin: 10.9 g/dL (06-15 @ 11:51)  Hematocrit: 33.8 % (06-15 @ 11:51)    A/P:  31y  PPD #1 s/p  w/ 2nd degree laceration, repaired.  Doing Well.    PMHx: Lap appendectomy 2003, L knee surgery 2006  Current Issues: none    Increase OOB  Regular diet  PO Pain protocol  Routine Postpartum Care    BRYANT QuinteroS     Postpartum Note- PPD#1    Blood type: O Positive  RPR: Negative  Rubella: Immune    S: Patient w/o complaints, pain is controlled.    Pt is resting comfortably in bed. OOB. Tolerating PO. Voiding without issues. No acute complaints at this time.    O:  Vital Signs Last 24 Hrs  T(C): 36.9 (2022 06:06), Max: 36.9 (15 Osorio 2022 13:45)  T(F): 98.4 (2022 06:06), Max: 98.42 (15 Osorio 2022 13:45)  HR: 51 (2022 06:06) (48 - 108)  BP: 102/65 (2022 06:06) (102/65 - 137/65)  RR: 18 (2022 06:06) (18 - 18)  SpO2: 99% (2022 06:06) (87% - 100%)     Gen: NAD, resting comfortably  Abdomen: Soft, nontender, non-distended, fundus firm.  Lungs: CTA b/l  Cardio: RRR, S1S2  Ext: Negative calf tenderness    LABS:  Hemoglobin: 10.9 g/dL (15 @ 11:51)  Hematocrit: 33.8 % (15 @ 11:51)    A/P:  31y  PPD #1 s/p  w/ 2nd degree laceration, repaired.  Doing Well.    PMHx: Lap appendectomy 2003, L knee surgery 2006  Current Issues: none    Increase OOB  Regular diet  PO Pain protocol  Routine Postpartum Care    DEVON Quintero Note:  Agree with above note, In short, patient is a  PPD#1 s/p . Patient w/o complaints and doing well. Vitals stable.  Milla Avelar PA-C

## 2022-08-19 NOTE — PATIENT PROFILE OB - AS SC BRADEN ACTIVITY
(4) walks frequently Itraconazole Counseling:  I discussed with the patient the risks of itraconazole including but not limited to liver damage, nausea/vomiting, neuropathy, and severe allergy.  The patient understands that this medication is best absorbed when taken with acidic beverages such as non-diet cola or ginger ale.  The patient understands that monitoring is required including baseline LFTs and repeat LFTs at intervals.  The patient understands that they are to contact us or the primary physician if concerning signs are noted.

## 2023-03-20 ENCOUNTER — NON-APPOINTMENT (OUTPATIENT)
Age: 33
End: 2023-03-20

## 2023-05-31 NOTE — OB RN PATIENT PROFILE - NS MD HP INPLANTS MED DEV
Plan: Discussed how CARP can be recurrent, and tretinoin cream will help keep it at bay instead of continuing the minocycline PO tabs which caused her nausea Continue Regimen: tretinoin 0.05% topical cream applied qHS to the central chest; SED Detail Level: Zone Discontinue Regimen: Minocycline 100mg PO BID as the rash has nearly cleared. Continue Regimen: TAC 0.1% topical ointment applied BID for 2 weeks on and 1 week off break until rash resolves then stop None

## 2023-12-06 NOTE — OB PROVIDER H&P - PRO FEEDING PLAN INFANT OB
Routed to [facility] nurse to review and address Warfarin dosing.    initiation of breastfeeding/breast milk feeding

## 2024-01-05 ENCOUNTER — NON-APPOINTMENT (OUTPATIENT)
Age: 34
End: 2024-01-05

## 2024-05-22 NOTE — OB PROVIDER H&P - TERM DELIVERIES, OB PROFILE
Future Appointments    Encounter Information   Provider Department Appt Notes   6/5/2024 Rivas Reid, ILANAM [facility] Pod[facility]ry f/u nail trim     Past Visits    Date Provider Specialty Visit Type Primary Dx   05/08/2024 Rodolfo Souza MD Family Medicine Office Visit Essential hypertension     
2

## 2024-07-19 ENCOUNTER — APPOINTMENT (OUTPATIENT)
Dept: ANTEPARTUM | Facility: CLINIC | Age: 34
End: 2024-07-19
Payer: COMMERCIAL

## 2024-07-19 ENCOUNTER — ASOB RESULT (OUTPATIENT)
Age: 34
End: 2024-07-19

## 2024-07-19 PROCEDURE — 76811 OB US DETAILED SNGL FETUS: CPT

## 2024-11-08 ENCOUNTER — OUTPATIENT (OUTPATIENT)
Dept: OUTPATIENT SERVICES | Facility: HOSPITAL | Age: 34
LOS: 1 days | End: 2024-11-08
Payer: COMMERCIAL

## 2024-11-08 VITALS
DIASTOLIC BLOOD PRESSURE: 69 MMHG | RESPIRATION RATE: 15 BRPM | TEMPERATURE: 98 F | HEART RATE: 91 BPM | OXYGEN SATURATION: 99 % | SYSTOLIC BLOOD PRESSURE: 121 MMHG

## 2024-11-08 VITALS
SYSTOLIC BLOOD PRESSURE: 112 MMHG | HEART RATE: 71 BPM | DIASTOLIC BLOOD PRESSURE: 51 MMHG | TEMPERATURE: 98 F | RESPIRATION RATE: 18 BRPM

## 2024-11-08 DIAGNOSIS — Z90.89 ACQUIRED ABSENCE OF OTHER ORGANS: Chronic | ICD-10-CM

## 2024-11-08 DIAGNOSIS — O26.899 OTHER SPECIFIED PREGNANCY RELATED CONDITIONS, UNSPECIFIED TRIMESTER: ICD-10-CM

## 2024-11-08 PROCEDURE — 59025 FETAL NON-STRESS TEST: CPT

## 2024-11-08 PROCEDURE — G0463: CPT

## 2024-11-08 PROCEDURE — 99212 OFFICE O/P EST SF 10 MIN: CPT | Mod: 25

## 2024-11-08 PROCEDURE — 59025 FETAL NON-STRESS TEST: CPT | Mod: 26

## 2024-11-08 NOTE — OB PROVIDER TRIAGE NOTE - NSOBPROVIDERNOTE_OBGYN_ALL_OB_FT
35yo  @36 weeks and 1 day (JAVED:24) presents to triage with scant bleeding 33yo  @36 weeks and 1 day (JAVED:24) presents to triage with scant bleeding.   Plan:  -BPP:    -Reactive NST        -Patient educated to   -Discussed w/  33yo  @36 weeks and 1 day (JAVED:24) presents to triage with scant bleeding.   Plan:  -BPP:    -Reactive NST        -Patient educated fully on labor return precautions, patient is to follow up in the office on Wednesday, 24       -Discussed w/ Dr. Vail

## 2024-11-08 NOTE — OB PROVIDER TRIAGE NOTE - NS_FINALEDD_OBGYN_ALL_OB_DT
05-Dec-2024 Taltz Counseling: I discussed with the patient the risks of ixekizumab including but not limited to immunosuppression, serious infections, worsening of inflammatory bowel disease and drug reactions.  The patient understands that monitoring is required including a PPD at baseline and must alert us or the primary physician if symptoms of infection or other concerning signs are noted.

## 2024-11-08 NOTE — OB PROVIDER TRIAGE NOTE - HISTORY OF PRESENT ILLNESS
35yo  @36 weeks and 1 day (JAVED:24) presents to triage with intermittent spotting since 5pm. Patient admits to good fetal movement, and denies leakage of fluids or painful contractions at this time. PNC uncomplicated.     GBS: Negative    EFW: 3200g   OB: 2018, , full-term, boy, 8#2        2020, , full-term, boy, 7#8         2022, , full-term, boy, 7#11   GYN: Denies history of fibroids, abnormal pap smears, STDs, or ovarian cysts   Allergies: NKDA   Medical Hx: Denies history of HTN, DM, bleeding disorders, thyroid disorders   Medications: Prenatal vitamins     Surgical Hx: Laparoscopic appendectomy, , L. knee surgery, 2006, Tonsillectomy   Social Hx: Denies x3, no anxiety or depression

## 2024-11-08 NOTE — OB PROVIDER TRIAGE NOTE - NSHPPHYSICALEXAM_GEN_ALL_CORE
CV: S1,S2  Pulmonary: Clear to auscultation bilaterally    Abdomen: Gravid abdomen  Extremities: Nontender to palpation bilaterally     EFM: 130hr, moderate variability, +accelerations, -decelerations   TOCO: Uterine Irritability   SVE: .5/0/-3  BSUS: Vertex, BPP: 8/8, OLIVER: 15.1  SSE: Scant blood CV: S1,S2  Pulmonary: Clear to auscultation bilaterally    Abdomen: Gravid abdomen  Extremities: Nontender to palpation bilaterally     EFM: 130hr, moderate variability, +accelerations, -decelerations   TOCO: Uterine Irritability   SVE: .5/0/-3  BSUS: Vertex, BPP: 8/8, OLIVER: 15.1  SSE: Scant blood noted in speculum

## 2024-11-08 NOTE — OB RN TRIAGE NOTE - NSDCTEMPSITE_OBGYN_A_OB
"Subjective:       Patient ID: Roma Stapleton is a 59 y.o. female.    Chief Complaint: Establish Care    HPI:    Presents to [facility] with [facility] Provider, requests Dr. Melgar to be her PCP. But could not get in to see him today.   Was diagnosed with COVID from [facility] 2020. Was also dx with Pneumonia. Prescribed meds and reports doing much better. Reports dry cough started 4 days ago. Denies SOB, CP, wheezing, syncope, sore throat, or fever. Needs to be rechecked for COVID for employer and work clearance stating it is ok to go back to work.    Denies any other issues today.        History reviewed. No pertinent past medical history.    Past Surgical History:   Procedure Laterality Date     SECTION      COLONOSCOPY      KNEE SURGERY Right     SHOULDER SURGERY      TUBAL LIGATION         Family History   Problem Relation Age of Onset    Cancer Mother     Hypertension Mother     Diabetes Mother     Cancer Father     Diabetes Maternal Grandmother     Hypertension Maternal Grandmother        Social History     Tobacco Use    Smoking status: Never Smoker    Smokeless tobacco: Never Used   Substance Use Topics    Alcohol use: Not Currently    Drug use: Never       There is no problem list on file for this patient.        There is no immunization history on file for this patient.        Review of Systems   Respiratory: Negative for chest tightness, shortness of breath and wheezing.    Cardiovascular: Negative for chest pain, palpitations and leg swelling.   Neurological: Negative for dizziness, syncope, weakness and headaches.     Objective:     Vitals:    20 1344   BP: 130/80   BP Location: Right arm   Patient Position: Sitting   BP Method: Large (Manual)   Pulse: 92   Resp: 18   SpO2: 98%   Weight: 68.3 kg (150 lb 9.6 oz)   Height: 5' 2" (1.575 m)       Physical Exam  Vitals signs and nursing note reviewed.   Constitutional:       General: She is not in acute distress.     Appearance: " She is not diaphoretic.   HENT:      Head: Normocephalic and atraumatic.      Mouth/Throat:      Mouth: Mucous membranes are moist.      Pharynx: Oropharynx is clear.   Cardiovascular:      Rate and Rhythm: Normal rate and regular rhythm.      Heart sounds: Normal heart sounds. No murmur.   Pulmonary:      Effort: Pulmonary effort is normal. No respiratory distress.      Breath sounds: Normal breath sounds. No wheezing or rhonchi.   Chest:      Chest wall: No tenderness.   Neurological:      Mental Status: She is alert and oriented to person, place, and time.   Psychiatric:         Mood and Affect: Mood normal.         No visits with results within 6 Month(s) from this visit.   Latest known visit with results is:   No results found for any previous visit.         Assessment:      1. COVID-19 virus infection    2. Cough    3. SOB (shortness of breath)          Plan:     COVID-19 virus infection  Comments:  order recheck per patient employer request  Orders:  -     COVID-19 Routine Screening; Future; Expected date: 07/28/2020    Cough  Comments:  cont meds as directed    SOB (shortness of breath)  Comments:  improved         Current Outpatient Medications   Medication Sig Dispense Refill    vitamin D (VITAMIN D3) 1000 units Tab Take 1,000 Units by mouth once daily.       No current facility-administered medications for this visit.        There are no discontinued medications.    Health Maintenance   Topic Date Due    Hepatitis C Screening  1960    Lipid Panel  1960    TETANUS VACCINE  08/17/1976    Mammogram  08/17/1998       Patient Instructions   Continue medications as directed.     Continue to follow up with specialists as directed.    Report to nearest ER or call 911 if you begin to have worsening symptoms, difficulty breathing, turning blue, chest pain, B/P < 80/60 or >170/100, palpitations, syncope, extreme weakness, severe abdominal pain, or severe H/A. Patient verbalized understanding.      RTC to see Dr. Melgar as directed.    Will write return to work form once COVID test resulted and patient symptom free for 10 days.       Risks, benefits, and alternatives discussed with patient, Patient verbalized understanding of discussed plan of care. Asked patient if any further questions, answered no.    Future Appointments   Date Time Provider Department Center   8/11/2020  2:00 PM Peggy Melgar MD [facility] Matty You, WOODY, APRN, FNP-C   oral

## 2024-11-11 DIAGNOSIS — O09.523 SUPERVISION OF ELDERLY MULTIGRAVIDA, THIRD TRIMESTER: ICD-10-CM

## 2024-11-11 DIAGNOSIS — O26.853 SPOTTING COMPLICATING PREGNANCY, THIRD TRIMESTER: ICD-10-CM

## 2024-11-11 DIAGNOSIS — Z3A.36 36 WEEKS GESTATION OF PREGNANCY: ICD-10-CM

## 2024-11-23 ENCOUNTER — INPATIENT (INPATIENT)
Facility: HOSPITAL | Age: 34
LOS: 1 days | Discharge: HOME | End: 2024-11-25
Attending: STUDENT IN AN ORGANIZED HEALTH CARE EDUCATION/TRAINING PROGRAM | Admitting: STUDENT IN AN ORGANIZED HEALTH CARE EDUCATION/TRAINING PROGRAM
Payer: COMMERCIAL

## 2024-11-23 VITALS
SYSTOLIC BLOOD PRESSURE: 123 MMHG | TEMPERATURE: 98 F | DIASTOLIC BLOOD PRESSURE: 74 MMHG | RESPIRATION RATE: 18 BRPM | OXYGEN SATURATION: 98 % | HEART RATE: 64 BPM

## 2024-11-23 DIAGNOSIS — Z34.80 ENCOUNTER FOR SUPERVISION OF OTHER NORMAL PREGNANCY, UNSPECIFIED TRIMESTER: ICD-10-CM

## 2024-11-23 DIAGNOSIS — O26.899 OTHER SPECIFIED PREGNANCY RELATED CONDITIONS, UNSPECIFIED TRIMESTER: ICD-10-CM

## 2024-11-23 DIAGNOSIS — Z90.89 ACQUIRED ABSENCE OF OTHER ORGANS: Chronic | ICD-10-CM

## 2024-11-23 RX ORDER — TRISODIUM CITRATE DIHYDRATE AND CITRIC ACID MONOHYDRATE 500; 334 MG/5ML; MG/5ML
15 SOLUTION ORAL EVERY 6 HOURS
Refills: 0 | Status: DISCONTINUED | OUTPATIENT
Start: 2024-11-23 | End: 2024-11-24

## 2024-11-23 RX ORDER — 0.9 % SODIUM CHLORIDE 0.9 %
1000 INTRAVENOUS SOLUTION INTRAVENOUS
Refills: 0 | Status: DISCONTINUED | OUTPATIENT
Start: 2024-11-23 | End: 2024-11-24

## 2024-11-23 RX ORDER — CHLORHEXIDINE GLUCONATE 1.2 MG/ML
1 RINSE ORAL DAILY
Refills: 0 | Status: DISCONTINUED | OUTPATIENT
Start: 2024-11-23 | End: 2024-11-24

## 2024-11-23 NOTE — OB PROVIDER H&P - NSLOWPPHRISK_OBGYN_A_OB
No previous uterine incision/Jesus Pregnancy/Less than or equal to 4 previous vaginal births/No known bleeding disorder/No history of postpartum hemorrhage

## 2024-11-23 NOTE — OB PROVIDER H&P - NSHPPHYSICALEXAM_GEN_ALL_CORE
T(C): 36.7 (11-23-24 @ 21:02), Max: 36.7 (11-23-24 @ 20:23)  HR: 74 (11-23-24 @ 22:08) (64 - 84)  BP: 123/74 (11-23-24 @ 21:02) (123/74 - 123/74)  RR: 18 (11-23-24 @ 21:02) (18 - 18)  SpO2: 96% (11-23-24 @ 22:08) (94% - 98%)  Gen: NAD, well-appearing   Abd: Soft, gravid  SVE: 3/50/-3  Bedside sono: vertex  FHT: baseline   [employer]: q3 min

## 2024-11-23 NOTE — OB PROVIDER H&P - ASSESSMENT
A&P: 34y  at 38.2 weeks GA presents to L&D for rule out labor.   Labor: admit to L&D  -exp mgmt   -routine labs  -EFM/toco  -NPO, IV hydration  Fetal: cat 1 tracing, fetal status reassuring  GBS: neg  Analgesia:       Discussed with Dr. Durga Noguera PGY-2

## 2024-11-23 NOTE — OB PROVIDER TRIAGE NOTE - NSHPPHYSICALEXAM_GEN_ALL_CORE
T(C): 36.7 (11-23-24 @ 21:02), Max: 36.7 (11-23-24 @ 20:23)  HR: 74 (11-23-24 @ 22:08) (64 - 84)  BP: 123/74 (11-23-24 @ 21:02) (123/74 - 123/74)  RR: 18 (11-23-24 @ 21:02) (18 - 18)  SpO2: 96% (11-23-24 @ 22:08) (94% - 98%)  Gen: NAD, well-appearing   Abd: Soft, gravid  SVE: 3/50/-3  Bedside sono: vertex  FHT: baseline   [place]: q3 min

## 2024-11-23 NOTE — OB PROVIDER TRIAGE NOTE - HISTORY OF PRESENT ILLNESS
34y  at 38.2 weeks GA presents to L&D for rule out labor. Patient states her contractions began at 5pm and have been q5 minutes apart, and now getting closer together. Patient denies vaginal bleeding, contractions and leakage of fluid. She endorses good fetal movement. Denies fevers, chills, nausea and vomiting. No other complaints at this time.  Prenatal course uncomplicated.    JAVED: 2024    POB:  2018 FT VAVD, 8#2   FT , 7#8   FT , 7#11  PGYN: denies fibroids, ovarian cysts, STD hx, or abnormal PAPs   PMH: Denies  PSH: laparoscopic appendectomy (), left knee surgery (), tonsillectomy  SH: Denies EtOH, tobacco and illicit drug use during this pregnancy; feels safe at home   Psych Hx: denies hx of anxiety or depression  Meds: PNVs  Allergies: NKDA    EFW: 3230g (extrapolated from sono 1 week ago of 6#11)    GBS: negative

## 2024-11-23 NOTE — OB RN PATIENT PROFILE - PATIENT REPRESENTATIVE NAME
Dr. Ellison and Dr. Trujillo met this morning to review patient's proctogram films and discuss recommendations.  Dr. Trujillo would recommend continued PT.  Dr. Ellison would like to see her back in our office in a couple weeks to follow up on her response to the trigger point injection.  Patient would also like to discuss options to address her \"floppy vagina\".  An appointment is given May 13.   Rah Nickerson

## 2024-11-23 NOTE — OB PROVIDER TRIAGE NOTE - NSOBPROVIDERNOTE_OBGYN_ALL_OB_FT
A/P: 34y  at 38.2 weeks GA presents to L&D for rule out labor.  -Patient in latent labor  -CTX q 3min  -Cat I tracing  -Repeat VE in 2-3 hours    Discussed with Dr. Roberts, attending    Sandra Felton, PGY1

## 2024-11-23 NOTE — OB RN PATIENT PROFILE - WEIGHT: TOTAL WEIGHT IN LBS
76 y/o M with PMH of afib on eliquis (had cardioversion yesterday), prostate ca s/p resection presenting with L flank pain that started in the evening. Patient states pain radiates to his groin. Has associated n/v. States pain is similar to stones in past. No fevers, chills, cp, sob, LE swelling   CT with obstructing 8 mm L stone  s/p  L ureteral stent.  Hypotensive post procedure SIRS/sepsis  blood Cx negative  Urine cx pending  Now off pressors though on midodrine  No s/s any other foci    urine  Cultures pending  Rec:      A) UTI   Pyelo   Nephrolithiasis s/p stent  No recent abx  prior E coli in urine pan sensitive  SIRS  follow urine cx  For now continue zosyn  Follow clinically  If leucocytosis remains or SIRS continues would recommend repeat imaging     B) Nephrolithiasis  s/p stent  further plan per urology    C) leucocytosis  ? due to above  Plan as detailed above     Will tailor plan for ID issues  per course,results.Will defer to primary team on management of other issues.  Assessment, plan and recommendations as detailed above were discussed with the MICU   team.  Will Follow.  Beeper [phone] [facility] [id number].   Wknd/afterhours/No response-[phone] or Fellow on call       28

## 2024-11-23 NOTE — OB RN PATIENT PROFILE - NS_LMP_OBGYN_ALL_OB_DT
An 87 yo Female patient with PMHx significant for DVT (11/2019) not AC, intermittent asthma (never intubated/no icu stays), polymyalgia rheumatica (chronic prednisone), dementia, osteoporosis with multiple vertebral compression, presents today BIBEMS due to chest pain and SOB. Being admitted for chest pain, rule out ACS  At the ED was found to be hyperglycemic (636) 8 units of regular insulin; blood glucose dropped to 66, dextrose was given. Also noted to have a fever Tmax 102 rectally and a + UA          Sepsis secondary to UTI   +UA   + urine culture with Ecoli  on Rocephin completed 5 days     Patient with severe abdominal pain and distention   Crandall placed for urinary retention - failed trials of void x 3         Lethargy secondary to Iatrogenic Induced Hypoglycemia   Glucose of 636 on BMP, unlikely real  Pt was given 8 U of regular insulin by ED staff, glucose dropped to 66, pt required D50% 25 mg x2  Will avoid Insulin at this time   currently on diet         Chest Pain,    Trops -x3,   [facility] called   pt currently rate controlled       Afib RVR and Aflutter   S/p Cardizem IVP x1 in ED and lopressor x 1 last night -- helped for a short period of time but pts BP was 114/60   CHADSVASC: 4  Not on AC due to GI bleed   approx 1 month ago was admitted for GIB     Duodenal ulcers  -recent gib w/ ulcers found on EGD  on protonix bid     Hypokalemia   K: 2.9 on admission   S/p K raiders          Polymyalgia Rheumatica  Hold Prednisone while NPO  if bp drops consider stress dose steroids.       Mild-Moderate Intermittent Asthma  Continue Symbicort/Albuterol PRN  O2 via NC if needed PRN titrate FIO2 >90% and <96%    Failure to thrive   Patient cachectic     Dysphagia    swallow eval   currently on dysphagia 2, nectar consistency fluids         DVT Prophylaxis   Intermittent compression     Code status -- pt with MOLST   DNR/ DNI     family and insurance issues   d/c on hold until resolved 29-Feb-2024

## 2024-11-23 NOTE — OB PROVIDER H&P - ATTENDING COMMENTS
at 38 + weeks neg GBS presents in latent labor requesting an epidural. Pregnancy has been uncomplicated    VSS AF    VE: /-2 AROM-clear scant fluid  toco: ctx q 1 min  EFM: FHR reactive moderate variabilty, baseline 120   HIV/RPR/Hep B and C neg    A/P: Labor at term  -AROM  -consents signed  -epidural for pain working well  -garcia in place  Christine Roberts MD

## 2024-11-24 LAB
BASOPHILS # BLD AUTO: 0.04 K/UL — SIGNIFICANT CHANGE UP (ref 0–0.2)
BASOPHILS NFR BLD AUTO: 0.3 % — SIGNIFICANT CHANGE UP (ref 0–2)
EOSINOPHIL # BLD AUTO: 0.13 K/UL — SIGNIFICANT CHANGE UP (ref 0–0.5)
EOSINOPHIL NFR BLD AUTO: 1.1 % — SIGNIFICANT CHANGE UP (ref 0–6)
HCT VFR BLD CALC: 34.6 % — SIGNIFICANT CHANGE UP (ref 34.5–45)
HGB BLD-MCNC: 11.7 G/DL — SIGNIFICANT CHANGE UP (ref 11.5–15.5)
IMM GRANULOCYTES NFR BLD AUTO: 0.6 % — SIGNIFICANT CHANGE UP (ref 0–0.9)
LYMPHOCYTES # BLD AUTO: 28.8 % — SIGNIFICANT CHANGE UP (ref 13–44)
LYMPHOCYTES # BLD AUTO: 3.38 K/UL — HIGH (ref 1–3.3)
MCHC RBC-ENTMCNC: 31.5 PG — SIGNIFICANT CHANGE UP (ref 27–34)
MCHC RBC-ENTMCNC: 33.8 G/DL — SIGNIFICANT CHANGE UP (ref 32–36)
MCV RBC AUTO: 93.3 FL — SIGNIFICANT CHANGE UP (ref 80–100)
MONOCYTES # BLD AUTO: 0.87 K/UL — SIGNIFICANT CHANGE UP (ref 0–0.9)
MONOCYTES NFR BLD AUTO: 7.4 % — SIGNIFICANT CHANGE UP (ref 2–14)
NEUTROPHILS # BLD AUTO: 7.25 K/UL — SIGNIFICANT CHANGE UP (ref 1.8–7.4)
NEUTROPHILS NFR BLD AUTO: 61.8 % — SIGNIFICANT CHANGE UP (ref 43–77)
NRBC # BLD: 0 /100 WBCS — SIGNIFICANT CHANGE UP (ref 0–0)
PLATELET # BLD AUTO: 220 K/UL — SIGNIFICANT CHANGE UP (ref 150–400)
RBC # BLD: 3.71 M/UL — LOW (ref 3.8–5.2)
RBC # FLD: 13.2 % — SIGNIFICANT CHANGE UP (ref 10.3–14.5)
T PALLIDUM AB TITR SER: NEGATIVE — SIGNIFICANT CHANGE UP
WBC # BLD: 11.74 K/UL — HIGH (ref 3.8–10.5)
WBC # FLD AUTO: 11.74 K/UL — HIGH (ref 3.8–10.5)

## 2024-11-24 RX ORDER — OXYCODONE HYDROCHLORIDE 30 MG/1
5 TABLET ORAL
Refills: 0 | Status: DISCONTINUED | OUTPATIENT
Start: 2024-11-24 | End: 2024-11-25

## 2024-11-24 RX ORDER — IBUPROFEN 200 MG
600 TABLET ORAL EVERY 6 HOURS
Refills: 0 | Status: COMPLETED | OUTPATIENT
Start: 2024-11-24 | End: 2025-10-23

## 2024-11-24 RX ORDER — SIMETHICONE 125 MG
80 CAPSULE ORAL EVERY 4 HOURS
Refills: 0 | Status: DISCONTINUED | OUTPATIENT
Start: 2024-11-24 | End: 2024-11-25

## 2024-11-24 RX ORDER — TETANUS TOXOID, REDUCED DIPHTHERIA TOXOID AND ACELLULAR PERTUSSIS VACCINE, ADSORBED 5; 2.5; 8; 8; 2.5 [IU]/.5ML; [IU]/.5ML; UG/.5ML; UG/.5ML; UG/.5ML
0.5 SUSPENSION INTRAMUSCULAR ONCE
Refills: 0 | Status: DISCONTINUED | OUTPATIENT
Start: 2024-11-24 | End: 2024-11-25

## 2024-11-24 RX ORDER — OXYCODONE HYDROCHLORIDE 30 MG/1
5 TABLET ORAL ONCE
Refills: 0 | Status: DISCONTINUED | OUTPATIENT
Start: 2024-11-24 | End: 2024-11-25

## 2024-11-24 RX ORDER — WITCH HAZEL 50 G/100ML
1 SOLUTION RECTAL EVERY 4 HOURS
Refills: 0 | Status: DISCONTINUED | OUTPATIENT
Start: 2024-11-24 | End: 2024-11-25

## 2024-11-24 RX ORDER — LANOLIN 72 %
1 OINTMENT (GRAM) TOPICAL EVERY 6 HOURS
Refills: 0 | Status: DISCONTINUED | OUTPATIENT
Start: 2024-11-24 | End: 2024-11-25

## 2024-11-24 RX ORDER — KETOROLAC TROMETHAMINE 30 MG/ML
30 INJECTION INTRAMUSCULAR; INTRAVENOUS ONCE
Refills: 0 | Status: DISCONTINUED | OUTPATIENT
Start: 2024-11-24 | End: 2024-11-24

## 2024-11-24 RX ORDER — DIBUCAINE 1 %
1 OINTMENT (GRAM) TOPICAL EVERY 6 HOURS
Refills: 0 | Status: DISCONTINUED | OUTPATIENT
Start: 2024-11-24 | End: 2024-11-25

## 2024-11-24 RX ORDER — 0.9 % SODIUM CHLORIDE 0.9 %
500 INTRAVENOUS SOLUTION INTRAVENOUS ONCE
Refills: 0 | Status: DISCONTINUED | OUTPATIENT
Start: 2024-11-24 | End: 2024-11-25

## 2024-11-24 RX ORDER — HYDROCORTISONE BUTYRATE 0.1 %
1 CREAM (GRAM) TOPICAL EVERY 6 HOURS
Refills: 0 | Status: DISCONTINUED | OUTPATIENT
Start: 2024-11-24 | End: 2024-11-25

## 2024-11-24 RX ORDER — 0.9 % SODIUM CHLORIDE 0.9 %
1000 INTRAVENOUS SOLUTION INTRAVENOUS
Refills: 0 | Status: DISCONTINUED | OUTPATIENT
Start: 2024-11-24 | End: 2024-11-24

## 2024-11-24 RX ORDER — DIPHENHYDRAMINE HCL 25 MG
25 CAPSULE ORAL EVERY 6 HOURS
Refills: 0 | Status: DISCONTINUED | OUTPATIENT
Start: 2024-11-24 | End: 2024-11-25

## 2024-11-24 RX ORDER — ACETAMINOPHEN 500MG 500 MG/1
975 TABLET, COATED ORAL
Refills: 0 | Status: DISCONTINUED | OUTPATIENT
Start: 2024-11-24 | End: 2024-11-25

## 2024-11-24 RX ORDER — ONDANSETRON HYDROCHLORIDE 4 MG/1
4 TABLET, FILM COATED ORAL ONCE
Refills: 0 | Status: COMPLETED | OUTPATIENT
Start: 2024-11-24 | End: 2024-11-24

## 2024-11-24 RX ORDER — PRAMOXINE HYDROCHLORIDE 1 MG/ML
1 LOTION TOPICAL EVERY 4 HOURS
Refills: 0 | Status: DISCONTINUED | OUTPATIENT
Start: 2024-11-24 | End: 2024-11-25

## 2024-11-24 RX ORDER — IBUPROFEN 200 MG
600 TABLET ORAL EVERY 6 HOURS
Refills: 0 | Status: DISCONTINUED | OUTPATIENT
Start: 2024-11-24 | End: 2024-11-25

## 2024-11-24 RX ORDER — SODIUM CHLORIDE 9 MG/ML
3 INJECTION, SOLUTION INTRAMUSCULAR; INTRAVENOUS; SUBCUTANEOUS EVERY 8 HOURS
Refills: 0 | Status: DISCONTINUED | OUTPATIENT
Start: 2024-11-24 | End: 2024-11-25

## 2024-11-24 RX ORDER — .BETA.-CAROTENE, SODIUM ACETATE, ASCORBIC ACID, CHOLECALCIFEROL, .ALPHA.-TOCOPHEROL ACETATE, DL-, THIAMINE MONONITRATE, RIBOFLAVIN, NIACINAMIDE, PYRIDOXINE HYDROCHLORIDE, FOLIC ACID, CYANOCOBALAMIN, CALCIUM CARBONATE, FERROUS FUMARATE, ZINC OXIDE AND CUPRIC OXIDE 2000; 2000; 120; 400; 22; 1.84; 3; 20; 10; 1; 12; 200; 27; 25; 2 [IU]/1; [IU]/1; MG/1; [IU]/1; MG/1; MG/1; MG/1; MG/1; MG/1; MG/1; UG/1; MG/1; MG/1; MG/1; MG/1
1 TABLET ORAL DAILY
Refills: 0 | Status: DISCONTINUED | OUTPATIENT
Start: 2024-11-24 | End: 2024-11-25

## 2024-11-24 RX ORDER — BENZOCAINE 10 %
1 OINTMENT (GRAM) TOPICAL EVERY 6 HOURS
Refills: 0 | Status: DISCONTINUED | OUTPATIENT
Start: 2024-11-24 | End: 2024-11-25

## 2024-11-24 RX ADMIN — ACETAMINOPHEN 500MG 975 MILLIGRAM(S): 500 TABLET, COATED ORAL at 12:57

## 2024-11-24 RX ADMIN — ACETAMINOPHEN 500MG 975 MILLIGRAM(S): 500 TABLET, COATED ORAL at 18:30

## 2024-11-24 RX ADMIN — Medication 600 MILLIGRAM(S): at 15:13

## 2024-11-24 RX ADMIN — ACETAMINOPHEN 500MG 975 MILLIGRAM(S): 500 TABLET, COATED ORAL at 12:18

## 2024-11-24 RX ADMIN — Medication 2 MILLIUNIT(S)/MIN: at 04:07

## 2024-11-24 RX ADMIN — ACETAMINOPHEN 500MG 975 MILLIGRAM(S): 500 TABLET, COATED ORAL at 17:59

## 2024-11-24 RX ADMIN — Medication 600 MILLIGRAM(S): at 20:14

## 2024-11-24 RX ADMIN — .BETA.-CAROTENE, SODIUM ACETATE, ASCORBIC ACID, CHOLECALCIFEROL, .ALPHA.-TOCOPHEROL ACETATE, DL-, THIAMINE MONONITRATE, RIBOFLAVIN, NIACINAMIDE, PYRIDOXINE HYDROCHLORIDE, FOLIC ACID, CYANOCOBALAMIN, CALCIUM CARBONATE, FERROUS FUMARATE, ZINC OXIDE AND CUPRIC OXIDE 1 TABLET(S): 2000; 2000; 120; 400; 22; 1.84; 3; 20; 10; 1; 12; 200; 27; 25; 2 TABLET ORAL at 15:13

## 2024-11-24 RX ADMIN — KETOROLAC TROMETHAMINE 30 MILLIGRAM(S): 30 INJECTION INTRAMUSCULAR; INTRAVENOUS at 08:15

## 2024-11-24 RX ADMIN — ONDANSETRON HYDROCHLORIDE 4 MILLIGRAM(S): 4 TABLET, FILM COATED ORAL at 10:39

## 2024-11-24 RX ADMIN — Medication 600 MILLIGRAM(S): at 21:46

## 2024-11-24 RX ADMIN — Medication 600 MILLIGRAM(S): at 15:45

## 2024-11-24 NOTE — DISCHARGE NOTE OB - CARE PLAN
Principal Discharge DX:	Normal labor  Assessment and plan of treatment:	Pt admitted in labor and had a vaginal delivery of female infant. Stable for discharge on PPD   1

## 2024-11-24 NOTE — DISCHARGE NOTE OB - MEDICATION SUMMARY - MEDICATIONS TO TAKE
I will START or STAY ON the medications listed below when I get home from the hospital:    ibuprofen 600 mg oral tablet  -- 1 tab(s) by mouth every 6 hours  -- Indication: For pain     acetaminophen 325 mg oral tablet  -- 2 tab(s) by mouth every 6 hours  -- Indication: For pain     Prenatal Multivitamins with Folic Acid 1 mg oral tablet  -- 1 tab(s) by mouth once a day  -- Indication: For postparutm care

## 2024-11-24 NOTE — DISCHARGE NOTE OB - FINANCIAL ASSISTANCE
[facility] provides services at a reduced cost to those who are determined to be eligible through [facility]’s financial assistance program. Information regarding [facility]’s financial assistance program can be found by going to [email or web address] or by calling [phone].

## 2024-11-24 NOTE — PROVIDER CONTACT NOTE (OTHER) - ACTION/TREATMENT ORDERED:
Patient educated on s/s of PEC and aware to alert staff if they arise.   Dr Roberts aware, BP monitored as per policy. No other intervention at this time.

## 2024-11-24 NOTE — OB PROVIDER LABOR PROGRESS NOTE - NS_SUBJECTIVE/OBJECTIVE_OBGYN_ALL_OB_FT
S:  Pt seen and examined for increased pain and pressure.    O:  Vital Signs Last 24 Hrs  T(C): 36.7 (23 Nov 2024 23:39), Max: 36.7 (23 Nov 2024 20:23)  T(F): 98.1 (23 Nov 2024 23:39), Max: 98.1 (23 Nov 2024 20:23)  HR: 68 (24 Nov 2024 02:13) (58 - 88)  BP: 141/68 (24 Nov 2024 02:13) (107/55 - 141/68)  BP(mean): --  RR: 18 (23 Nov 2024 23:39) (18 - 18)  SpO2: 100% (24 Nov 2024 02:13) (91% - 100%)    Parameters below as of 23 Nov 2024 23:39  Patient On (Oxygen Delivery Method): room air
Patient examined for labor progress given rectal pressure. Patient tolerated exam well.     Vital Signs Last 24 Hrs  T(C): 36.8 (24 Nov 2024 05:36), Max: 36.8 (24 Nov 2024 03:45)  T(F): 98.24 (24 Nov 2024 05:36), Max: 98.24 (24 Nov 2024 03:45)  HR: 63 (24 Nov 2024 06:09) (54 - 88)  BP: 120/71 (24 Nov 2024 05:43) (103/77 - 141/68)  RR: 18 (23 Nov 2024 23:39) (18 - 18)  SpO2: 100% (24 Nov 2024 06:09) (91% - 100%)    Parameters below as of 23 Nov 2024 23:39  Patient On (Oxygen Delivery Method): room air
Patient examined for cervical change due to recurrent decelerations  Patient states she feels much more pain/pressure over the past 15 minutes since her last exam

## 2024-11-24 NOTE — DISCHARGE NOTE OB - HOSPITAL COURSE
presented in labor and had an epidural and AROM. She had a vaginal delivery of female infant and is stable for discharge on PPD  presented in labor and had an epidural and AROM. She had a vaginal delivery of female infant and is stable for discharge on PPD1

## 2024-11-24 NOTE — DISCHARGE NOTE OB - CARE PROVIDER_API CALL
Christine Roberts  Obstetrics and Gynecology  [address], [address]  [place] [address]  Phone: [phone]  Fax: [phone]  Follow Up Time:

## 2024-11-24 NOTE — DISCHARGE NOTE OB - PATIENT PORTAL LINK FT
You can access the FollowMyHealth Patient Portal offered by [facility] by registering at the following website: [email or web address]. By joining [facility]’s FollowMyHealth portal, you will also be able to view your health information using other applications (apps) compatible with our system.

## 2024-11-24 NOTE — OB PROVIDER LABOR PROGRESS NOTE - NS_OBIHIFHRDETAILS_OBGYN_ALL_OB_FT
130bpm, mod, -accels, +recurrent early and late decels
baseline 115 bpm, moderate variability, +Accels, -decels
120/mod thor/-acels/late decels cat II

## 2024-11-24 NOTE — OB PROVIDER DELIVERY SUMMARY - NSSELHIDDEN_OBGYN_ALL_OB_FT
[NS_DeliveryAttending1_OBGYN_ALL_OB_FT:[id number]],[NS_DeliveryRN_OBGYN_ALL_OB_FT:[id number]]

## 2024-11-24 NOTE — OB RN DELIVERY SUMMARY - NSSELHIDDEN_OBGYN_ALL_OB_FT
[NS_DeliveryAttending1_OBGYN_ALL_OB_FT:[id number]],[NS_DeliveryRN_OBGYN_ALL_OB_FT:[id number]=]

## 2024-11-24 NOTE — OB PROVIDER LABOR PROGRESS NOTE - ASSESSMENT
35 y/o  @38w3d presented in labor.     -continue augmentation with pitocin. cat 2 tracing with new recurrent late decelerations.   -500 cc bolus to be given    d/w Dr. Armando Kumar MD, PGY-4 
Pt is a 35yo  admitted for labor.    - Continue exp mgmt  - Epidural in place, top off requested  - Continue cont EFM, toco, IVF  - GBS negative    D/w Dr. Roberts, attending  Sandra Felton MD PGY1
Patient with rapid cervical change  Will begin pushing  Continuous EFM/toco    d/w Dr. Armando Combs PGY3

## 2024-11-24 NOTE — OB PROVIDER DELIVERY SUMMARY - NSPROVIDERDELIVERYNOTE_OBGYN_ALL_OB_FT
Patient progressed to fully dilated and pushed a vigorous female infant in OA position over a small midline episiotomy in the perineum. Body delivered easily and mouth bulb suctioned. Delayed cord clamping performed for 30 seconds and baby placed on maternal abdomen for skin to skin. Cord gas and blood obtained. Placenta delivered spontaneously intact. Fundus firm, Second degree laceration extension from midline episiotomy repaired in standard fashion with 2.0 vicryl rapide. Pt and  stable in LDR.  Counts correct  Cleo Roberts MD

## 2024-11-24 NOTE — OB RN DELIVERY SUMMARY - NS_SEPSISRSKCALC_OBGYN_ALL_OB_FT
EOS calculated successfully. EOS Risk Factor: 0.5/1000 live births ([facility] national incidence); GA=38w3d; Temp=98.24; ROM=5.883; GBS='Negative'; Antibiotics='No antibiotics or any antibiotics < 2 hrs prior to birth'

## 2024-11-25 VITALS
RESPIRATION RATE: 18 BRPM | SYSTOLIC BLOOD PRESSURE: 111 MMHG | HEART RATE: 62 BPM | TEMPERATURE: 98 F | DIASTOLIC BLOOD PRESSURE: 71 MMHG | OXYGEN SATURATION: 96 %

## 2024-11-25 PROCEDURE — 59050 FETAL MONITOR W/REPORT: CPT

## 2024-11-25 PROCEDURE — 86901 BLOOD TYPING SEROLOGIC RH(D): CPT

## 2024-11-25 PROCEDURE — 86780 TREPONEMA PALLIDUM: CPT

## 2024-11-25 PROCEDURE — 85025 COMPLETE CBC W/AUTO DIFF WBC: CPT

## 2024-11-25 PROCEDURE — 86850 RBC ANTIBODY SCREEN: CPT

## 2024-11-25 PROCEDURE — 86900 BLOOD TYPING SEROLOGIC ABO: CPT

## 2024-11-25 RX ADMIN — Medication 600 MILLIGRAM(S): at 08:23

## 2024-11-25 RX ADMIN — ACETAMINOPHEN 500MG 975 MILLIGRAM(S): 500 TABLET, COATED ORAL at 06:50

## 2024-11-25 RX ADMIN — ACETAMINOPHEN 500MG 975 MILLIGRAM(S): 500 TABLET, COATED ORAL at 00:13

## 2024-11-25 RX ADMIN — ACETAMINOPHEN 500MG 975 MILLIGRAM(S): 500 TABLET, COATED ORAL at 11:37

## 2024-11-25 RX ADMIN — .BETA.-CAROTENE, SODIUM ACETATE, ASCORBIC ACID, CHOLECALCIFEROL, .ALPHA.-TOCOPHEROL ACETATE, DL-, THIAMINE MONONITRATE, RIBOFLAVIN, NIACINAMIDE, PYRIDOXINE HYDROCHLORIDE, FOLIC ACID, CYANOCOBALAMIN, CALCIUM CARBONATE, FERROUS FUMARATE, ZINC OXIDE AND CUPRIC OXIDE 1 TABLET(S): 2000; 2000; 120; 400; 22; 1.84; 3; 20; 10; 1; 12; 200; 27; 25; 2 TABLET ORAL at 11:37

## 2024-11-25 RX ADMIN — ACETAMINOPHEN 500MG 975 MILLIGRAM(S): 500 TABLET, COATED ORAL at 01:46

## 2024-11-25 RX ADMIN — ACETAMINOPHEN 500MG 975 MILLIGRAM(S): 500 TABLET, COATED ORAL at 05:08

## 2024-11-25 NOTE — PROGRESS NOTE ADULT - ASSESSMENT
Assessment:   35yo now postpartum day 1 from a , recovering well.     Plan:   - Continue scheduled Ibuprofen and Acetaminophen for pain, Oxycodone available PRN for breakthrough pain.  - Increase ambulation, SCDs when not ambulating  - Continue regular diet    Martha Bustillos, PGY-1

## 2024-11-25 NOTE — PROGRESS NOTE ADULT - SUBJECTIVE AND OBJECTIVE BOX
OB Postpartum Progress Note: PPD #1     35yo now PPD #1 after  seen and examined at bedside, no acute overnight events. Patient denies current complaints, her pain is well controlled. States she is ambulating, voiding spontaneously, passing gas, and tolerating regular diet. Denies CP, SOB, N/V, HA, blurred vision, epigastric pain.    Vital Signs Last 24 Hours  T(C): 36.6 (24 @ 01:00), Max: 37.1 (24 @ 13:03)  HR: 70 (24 @ 01:00) (49 - 138)  BP: 98/57 (24 @ 01:00) (98/57 - 145/71)  RR: 17 (24 @ :00) (17 - 18)  SpO2: 97% (24 @ 01:00) (76% - 100%)    Physical Exam:  General: NAD, resting comfortably in bed   Abdomen: Soft, non-tender, non-distended, fundus firm  Extremities: Full ROM, moving all extremities spontaneously, no edema  Pelvic: Lochia wnl    Labs:    Blood Type: O Positive  Antibody Screen: Negative  RPR: Negative               11.7   11.74 )-----------( 220      (  @ 00:10 )             34.6         MEDICATIONS  (STANDING):  acetaminophen     Tablet .. 975 milliGRAM(s) Oral <User Schedule>  diphtheria/tetanus/pertussis (acellular) Vaccine (Adacel) 0.5 milliLiter(s) IntraMuscular once  ibuprofen  Tablet. 600 milliGRAM(s) Oral every 6 hours  lactated ringers Bolus 500 milliLiter(s) IV Bolus once  oxytocin Infusion 167 milliUNIT(s)/Min (167 mL/Hr) IV Continuous <Continuous>  oxytocin Infusion 167 milliUNIT(s)/Min (167 mL/Hr) IV Continuous <Continuous>  prenatal multivitamin 1 Tablet(s) Oral daily  sodium chloride 0.9% lock flush 3 milliLiter(s) IV Push every 8 hours    MEDICATIONS  (PRN):  benzocaine 20%/menthol 0.5% Spray 1 Spray(s) Topical every 6 hours PRN for Perineal discomfort  dibucaine 1% Ointment 1 Application(s) Topical every 6 hours PRN Perineal discomfort  diphenhydrAMINE 25 milliGRAM(s) Oral every 6 hours PRN Pruritus  hydrocortisone 1% Cream 1 Application(s) Topical every 6 hours PRN Moderate Pain (4-6)  lanolin Ointment 1 Application(s) Topical every 6 hours PRN nipple soreness  magnesium hydroxide Suspension 30 milliLiter(s) Oral two times a day PRN Constipation  oxyCODONE    IR 5 milliGRAM(s) Oral every 3 hours PRN Moderate to Severe Pain (4-10)  oxyCODONE    IR 5 milliGRAM(s) Oral once PRN Moderate to Severe Pain (4-10)  pramoxine 1%/zinc 5% Cream 1 Application(s) Topical every 4 hours PRN Moderate Pain (4-6)  simethicone 80 milliGRAM(s) Chew every 4 hours PRN Gas  witch hazel Pads 1 Application(s) Topical every 4 hours PRN Perineal discomfort

## 2024-11-25 NOTE — PROGRESS NOTE ADULT - ATTENDING COMMENTS
PPD1 s/p , doing well   -Discharge home   -Precautions given   -Follow-up in office in 6 weeks      Deonna Vail,

## 2025-05-04 ENCOUNTER — NON-APPOINTMENT (OUTPATIENT)
Age: 35
End: 2025-05-04